# Patient Record
Sex: FEMALE | Race: WHITE | NOT HISPANIC OR LATINO | Employment: OTHER | URBAN - METROPOLITAN AREA
[De-identification: names, ages, dates, MRNs, and addresses within clinical notes are randomized per-mention and may not be internally consistent; named-entity substitution may affect disease eponyms.]

---

## 2018-02-07 ENCOUNTER — PES CALL (OUTPATIENT)
Dept: ADMINISTRATIVE | Facility: CLINIC | Age: 73
End: 2018-02-07

## 2018-02-22 ENCOUNTER — DOCUMENTATION ONLY (OUTPATIENT)
Dept: FAMILY MEDICINE | Facility: CLINIC | Age: 73
End: 2018-02-22

## 2018-02-22 NOTE — PROGRESS NOTES
Pre-Visit Chart Review  For Appointment Scheduled on 02/23/2018      Health Maintenance Due   Topic Date Due    TETANUS VACCINE  11/25/1961    DEXA SCAN  11/25/1981    Zoster Vaccine  11/25/2003    Pneumococcal (65+) (1 of 2 - PCV13) 11/25/2008    Influenza Vaccine  08/01/2017    Mammogram  12/16/2017

## 2018-04-16 LAB
CHOLESTEROL, TOTAL: 193
HDLC SERPL-MCNC: 70 MG/DL
LDLC SERPL CALC-MCNC: 94 MG/DL
TRIGLYCERIDE (LIPID PAN): 144

## 2018-10-01 ENCOUNTER — LAB VISIT (OUTPATIENT)
Dept: LAB | Facility: HOSPITAL | Age: 73
End: 2018-10-01
Attending: FAMILY MEDICINE
Payer: MEDICARE

## 2018-10-01 ENCOUNTER — OFFICE VISIT (OUTPATIENT)
Dept: FAMILY MEDICINE | Facility: CLINIC | Age: 73
End: 2018-10-01
Payer: MEDICARE

## 2018-10-01 VITALS
BODY MASS INDEX: 23.2 KG/M2 | HEIGHT: 66 IN | DIASTOLIC BLOOD PRESSURE: 71 MMHG | WEIGHT: 144.38 LBS | TEMPERATURE: 98 F | HEART RATE: 71 BPM | SYSTOLIC BLOOD PRESSURE: 131 MMHG

## 2018-10-01 DIAGNOSIS — M80.00XD AGE-RELATED OSTEOPOROSIS WITH CURRENT PATHOLOGICAL FRACTURE WITH ROUTINE HEALING, SUBSEQUENT ENCOUNTER: ICD-10-CM

## 2018-10-01 DIAGNOSIS — R53.83 FATIGUE, UNSPECIFIED TYPE: ICD-10-CM

## 2018-10-01 DIAGNOSIS — N39.498 OTHER URINARY INCONTINENCE: ICD-10-CM

## 2018-10-01 DIAGNOSIS — E72.10: ICD-10-CM

## 2018-10-01 DIAGNOSIS — R53.83 FATIGUE, UNSPECIFIED TYPE: Primary | ICD-10-CM

## 2018-10-01 LAB
25(OH)D3+25(OH)D2 SERPL-MCNC: 50 NG/ML
ALBUMIN SERPL BCP-MCNC: 4.2 G/DL
ALP SERPL-CCNC: 72 U/L
ALT SERPL W/O P-5'-P-CCNC: 24 U/L
ANION GAP SERPL CALC-SCNC: 10 MMOL/L
AST SERPL-CCNC: 24 U/L
BASOPHILS # BLD AUTO: 0.04 K/UL
BASOPHILS NFR BLD: 0.6 %
BILIRUB SERPL-MCNC: 0.2 MG/DL
BUN SERPL-MCNC: 20 MG/DL
CALCIUM SERPL-MCNC: 9.6 MG/DL
CHLORIDE SERPL-SCNC: 108 MMOL/L
CO2 SERPL-SCNC: 24 MMOL/L
CREAT SERPL-MCNC: 0.8 MG/DL
DIFFERENTIAL METHOD: ABNORMAL
EOSINOPHIL # BLD AUTO: 0.2 K/UL
EOSINOPHIL NFR BLD: 2.4 %
ERYTHROCYTE [DISTWIDTH] IN BLOOD BY AUTOMATED COUNT: 14.2 %
EST. GFR  (AFRICAN AMERICAN): >60 ML/MIN/1.73 M^2
EST. GFR  (NON AFRICAN AMERICAN): >60 ML/MIN/1.73 M^2
GLUCOSE SERPL-MCNC: 88 MG/DL
HCT VFR BLD AUTO: 39.5 %
HGB BLD-MCNC: 12.6 G/DL
IMM GRANULOCYTES # BLD AUTO: 0.01 K/UL
IMM GRANULOCYTES NFR BLD AUTO: 0.1 %
LYMPHOCYTES # BLD AUTO: 3.1 K/UL
LYMPHOCYTES NFR BLD: 46.1 %
MCH RBC QN AUTO: 31.7 PG
MCHC RBC AUTO-ENTMCNC: 31.9 G/DL
MCV RBC AUTO: 100 FL
MONOCYTES # BLD AUTO: 0.5 K/UL
MONOCYTES NFR BLD: 7.2 %
NEUTROPHILS # BLD AUTO: 3 K/UL
NEUTROPHILS NFR BLD: 43.6 %
NRBC BLD-RTO: 0 /100 WBC
PLATELET # BLD AUTO: 275 K/UL
PMV BLD AUTO: 10.7 FL
POTASSIUM SERPL-SCNC: 4.2 MMOL/L
PROT SERPL-MCNC: 7.3 G/DL
RBC # BLD AUTO: 3.97 M/UL
SODIUM SERPL-SCNC: 142 MMOL/L
TSH SERPL DL<=0.005 MIU/L-ACNC: 1.48 UIU/ML
VIT B12 SERPL-MCNC: >2000 PG/ML
WBC # BLD AUTO: 6.77 K/UL

## 2018-10-01 PROCEDURE — 36415 COLL VENOUS BLD VENIPUNCTURE: CPT | Mod: PO

## 2018-10-01 PROCEDURE — 99999 PR PBB SHADOW E&M-EST. PATIENT-LVL IV: CPT | Mod: PBBFAC,,, | Performed by: FAMILY MEDICINE

## 2018-10-01 PROCEDURE — 99203 OFFICE O/P NEW LOW 30 MIN: CPT | Mod: 25,S$PBB,, | Performed by: FAMILY MEDICINE

## 2018-10-01 PROCEDURE — 82607 VITAMIN B-12: CPT

## 2018-10-01 PROCEDURE — 82306 VITAMIN D 25 HYDROXY: CPT

## 2018-10-01 PROCEDURE — 96372 THER/PROPH/DIAG INJ SC/IM: CPT | Mod: PBBFAC,PO

## 2018-10-01 PROCEDURE — 1101F PT FALLS ASSESS-DOCD LE1/YR: CPT | Mod: CPTII,,, | Performed by: FAMILY MEDICINE

## 2018-10-01 PROCEDURE — 99499 UNLISTED E&M SERVICE: CPT | Mod: S$GLB,,, | Performed by: FAMILY MEDICINE

## 2018-10-01 PROCEDURE — 84443 ASSAY THYROID STIM HORMONE: CPT

## 2018-10-01 PROCEDURE — 85025 COMPLETE CBC W/AUTO DIFF WBC: CPT

## 2018-10-01 PROCEDURE — 80053 COMPREHEN METABOLIC PANEL: CPT

## 2018-10-01 PROCEDURE — 99214 OFFICE O/P EST MOD 30 MIN: CPT | Mod: PBBFAC,PO,25 | Performed by: FAMILY MEDICINE

## 2018-10-01 RX ORDER — ERYTHROMYCIN 5 MG/G
OINTMENT OPHTHALMIC
COMMUNITY
Start: 2018-06-25

## 2018-10-01 RX ORDER — CYANOCOBALAMIN 1000 UG/ML
1000 INJECTION, SOLUTION INTRAMUSCULAR; SUBCUTANEOUS
Status: COMPLETED | OUTPATIENT
Start: 2018-10-01 | End: 2018-10-01

## 2018-10-01 RX ORDER — OXYBUTYNIN CHLORIDE 5 MG/1
5 TABLET, EXTENDED RELEASE ORAL DAILY
Qty: 90 TABLET | Refills: 0 | Status: SHIPPED | OUTPATIENT
Start: 2018-10-01 | End: 2023-04-10

## 2018-10-01 RX ORDER — VALACYCLOVIR HYDROCHLORIDE 1 G/1
TABLET, FILM COATED ORAL
Refills: 5 | COMMUNITY
Start: 2018-07-26 | End: 2019-08-23

## 2018-10-01 RX ORDER — ATORVASTATIN CALCIUM 10 MG/1
TABLET, FILM COATED ORAL
Refills: 2 | COMMUNITY
Start: 2018-07-26 | End: 2020-09-24

## 2018-10-01 RX ORDER — LINACLOTIDE 72 UG/1
CAPSULE, GELATIN COATED ORAL
Refills: 3 | COMMUNITY
Start: 2018-08-25 | End: 2022-11-08

## 2018-10-01 RX ORDER — TOBRAMYCIN 3 MG/ML
SOLUTION/ DROPS OPHTHALMIC
COMMUNITY
Start: 2018-06-25

## 2018-10-01 RX ADMIN — CYANOCOBALAMIN 1000 MCG: 1000 INJECTION INTRAMUSCULAR; SUBCUTANEOUS at 04:10

## 2018-10-01 NOTE — PROGRESS NOTES
Administered 2,000 mcg B-12 IM. Patient tolerated well. No bleeding at insertion site noted. Pain scale 0/10 with injection. 2 patient identifiers used (name, ), aseptic technique maintained.

## 2018-10-01 NOTE — PROGRESS NOTES
Subjective:   Patient ID: Gala Gaines is a 72 y.o. female     Chief Complaint:Fatigue      Pt with fatigue that is intermittent and has been going on for years. When it occurs the patient feels very fatigeud and unable to get out of bed. Sometimes occurs in early afternoon. Patient states in the past she has had b12 injections which have helped. Pt does endorse the loss of her son jolie 3 years ago. She nicolle with this through going to [facility]. She lives with her son and daughter-in-law.       Review of Systems   Constitutional: Positive for fatigue. Negative for chills and fever.   HENT: Negative for sore throat.    Eyes: Negative for visual disturbance.   Respiratory: Negative for shortness of breath.    Cardiovascular: Negative for chest pain.   Gastrointestinal: Negative for abdominal pain.   Endocrine: Negative for polyuria.   Genitourinary: Negative for dysuria.   Musculoskeletal: Negative for back pain.   Skin: Negative for color change.   Neurological: Negative for headaches.   Psychiatric/Behavioral: Negative for agitation and confusion.     History reviewed. No pertinent past medical history.  Objective:     Vitals:    10/01/18 1523   BP: 131/71   Pulse: 71   Temp: 98.1 °F (36.7 °C)     Body mass index is 23.31 kg/m².  Physical Exam   Constitutional: She is oriented to person, place, and time. She appears well-developed and well-nourished.   HENT:   Head: Normocephalic and atraumatic.   Eyes: EOM are normal. Pupils are equal, round, and reactive to light.   Neck: Normal range of motion. Neck supple.   Cardiovascular: Normal rate, regular rhythm, normal heart sounds and intact distal pulses.   Pulmonary/Chest: Effort normal and breath sounds normal.   Abdominal: Soft. She exhibits no distension.   Musculoskeletal: Normal range of motion.   Neurological: She is alert and oriented to person, place, and time.   Skin: Skin is warm and dry.   Psychiatric: She has a normal mood and affect. Her behavior is  normal. Judgment and thought content normal.   Nursing note and vitals reviewed.    Assessment:     1. Fatigue, unspecified type    2. Age-related osteoporosis with current pathological fracture with routine healing, subsequent encounter     3. Disorder of sulfur-bearing amino-acid metabolism     4. Other urinary incontinence      Plan:   Fatigue, unspecified type  -     Vitamin D; Future; Expected date: 10/01/2018  -     Vitamin B12; Future; Expected date: 10/01/2018  -     Comprehensive metabolic panel; Future; Expected date: 10/01/2018  -     CBC auto differential; Future; Expected date: 10/01/2018  -     TSH; Future; Expected date: 10/01/2018  -     URINALYSIS; Future; Expected date: 10/01/2018  -     cyanocobalamin injection 1,000 mcg; Inject 1 mL (1,000 mcg total) into the muscle one time.  - reviewed labwork form [facility] on 4/2018 and no major abnormalities but TSH was not ordered  - pt may need scheduled for sleep study if labwork does not show abnormality  - will give vitamin B12 injection, pt aware may not be covered by injection and is ok with cost    Age-related osteoporosis with current pathological fracture with routine healing, subsequent encounter   -     Vitamin D; Future; Expected date: 10/01/2018  - pt with history of osteoporosis  Disorder of sulfur-bearing amino-acid metabolism   -     Vitamin B12; Future; Expected date: 10/01/2018    Other urinary incontinence  -     oxybutynin (DITROPAN-XL) 5 MG TR24; Take 1 tablet (5 mg total) by mouth once daily.  Dispense: 90 tablet; Refill: 0        Discharge:   Time spent with patient: 45 minutes and over half of that time was spent on counseling an coordination of care.    Barriers to medications present (no )    Adverse reactions to current medications (no)    Over the counter medications reviewed (Yes)      Abdirahman Calle MD  10/01/2018

## 2018-10-05 ENCOUNTER — TELEPHONE (OUTPATIENT)
Dept: FAMILY MEDICINE | Facility: CLINIC | Age: 73
End: 2018-10-05

## 2018-10-05 NOTE — TELEPHONE ENCOUNTER
Called pt regarding below message. Pt is requesting to reschedule her flu shot appt. Appt date, time, and location given. Pt verbalized understanding with no further questions.     ----- Message from Rakesh Gillis sent at 10/5/2018 12:12 PM CDT -----  Contact: Pt  The Pt is requesting a call back regarding rescheduling the flu shot appointment. Please call Pt to advise.     Call Back#: [phone]   Thanks

## 2018-10-10 ENCOUNTER — CLINICAL SUPPORT (OUTPATIENT)
Dept: FAMILY MEDICINE | Facility: CLINIC | Age: 73
End: 2018-10-10
Payer: MEDICARE

## 2018-10-10 DIAGNOSIS — Z23 IMMUNIZATION DUE: Primary | ICD-10-CM

## 2018-10-10 PROCEDURE — 90670 PCV13 VACCINE IM: CPT | Mod: PBBFAC,PO

## 2018-10-10 PROCEDURE — 90662 IIV NO PRSV INCREASED AG IM: CPT | Mod: PBBFAC,PO

## 2018-10-10 NOTE — PROGRESS NOTES
Administered flu and prevnar vaccine IM. Patient tolerated well. No bleeding at insertion site noted. Pain scale 0/10 with injection. 2 patient identifiers used (name, ), aseptic technique maintained.

## 2018-12-18 ENCOUNTER — PATIENT OUTREACH (OUTPATIENT)
Dept: ADMINISTRATIVE | Facility: HOSPITAL | Age: 73
End: 2018-12-18

## 2018-12-18 ENCOUNTER — TELEPHONE (OUTPATIENT)
Dept: ADMINISTRATIVE | Facility: HOSPITAL | Age: 73
End: 2018-12-18

## 2018-12-18 DIAGNOSIS — Z78.0 ASYMPTOMATIC MENOPAUSAL STATE: Primary | ICD-10-CM

## 2018-12-18 NOTE — LETTER
December 18, 2018    Gala Gaines  [address]  [place] [address]             [facility]  [address]  [facility] [address]  Phone: [phone] [facility] is committed to your overall health and would like to ensure that you are up to date on your recommended test and/or procedures.   Abdirahman Calle MD  has found that your chart shows you may be due for the following:    BONE MINERAL DENSITY SCAN      If you have had any of the above done at another facility, please let us know so that we may obtain copies from that facility.  If you have a copy of these records, please provide a copy for us to scan into your chart.  You are welcome to request that the report be faxed to us at  ([phone]).     Otherwise, please schedule these appointments at your earliest convenience by calling [phone] or going to [email or web address].        Sincerely,  [facility]  MD Savita Amaya LPN Clinical Care Coordinator  [facility]  [address] [facility] [place] [address]  Phone [phone]  Fax [phone]

## 2018-12-19 ENCOUNTER — TELEPHONE (OUTPATIENT)
Dept: ADMINISTRATIVE | Facility: HOSPITAL | Age: 73
End: 2018-12-19

## 2018-12-24 ENCOUNTER — DOCUMENTATION ONLY (OUTPATIENT)
Dept: FAMILY MEDICINE | Facility: CLINIC | Age: 73
End: 2018-12-24

## 2019-08-05 ENCOUNTER — PATIENT OUTREACH (OUTPATIENT)
Dept: ADMINISTRATIVE | Facility: HOSPITAL | Age: 74
End: 2019-08-05

## 2019-08-05 NOTE — PROGRESS NOTES
Health Maintenance Due   Topic Date Due    TETANUS VACCINE  11/25/1961    DEXA SCAN  11/25/1983

## 2019-08-05 NOTE — LETTER
August 5, 2019    Gala Gaines  [address]  [place] [address]             [facility]  [address]  [facility] [address]  Phone: [phone] [facility] is committed to your overall health.  To help you get the most out of each of your visits, we will review your information to make sure you are up to date on all of your recommended tests and/or procedures.      Dr. Abdirahman Calle MD has found that your chart shows you may be due for a:    BONE MINERAL DENSITY SCAN (DEXA)       If you have had any of the above done at another facility, please bring the records or information with you so that your record at [facility] will be complete.  If you would like to schedule any of these, please contact the clinic at [phone].    If you are currently taking medication, please bring it with you to your appointment for review.    Also, if you have any type of Advanced Directives, please bring them with you to your office visit so we may scan them into your chart.    Thank You,    [facility],  MD Halina Thakur LPN Clinical Care Coordinator  [place] [facility]  [address] [place] [address]  Phone [phone]  Fax [phone]

## 2019-08-15 ENCOUNTER — DOCUMENTATION ONLY (OUTPATIENT)
Dept: FAMILY MEDICINE | Facility: CLINIC | Age: 74
End: 2019-08-15

## 2019-08-15 NOTE — PROGRESS NOTES
Pre-Visit Chart Review  For Appointment Scheduled on 8/16/19    Health Maintenance Due   Topic Date Due    TETANUS VACCINE  11/25/1961    DEXA SCAN  11/25/1983

## 2019-08-22 ENCOUNTER — DOCUMENTATION ONLY (OUTPATIENT)
Dept: FAMILY MEDICINE | Facility: CLINIC | Age: 74
End: 2019-08-22

## 2019-08-22 NOTE — PROGRESS NOTES
Pre-Visit Chart Review  For Appointment Scheduled on 8/23/19    Health Maintenance Due   Topic Date Due    TETANUS VACCINE  11/25/1961    DEXA SCAN  11/25/1983

## 2019-08-23 ENCOUNTER — OFFICE VISIT (OUTPATIENT)
Dept: FAMILY MEDICINE | Facility: CLINIC | Age: 74
End: 2019-08-23
Payer: MEDICARE

## 2019-08-23 VITALS
HEIGHT: 66 IN | WEIGHT: 140 LBS | DIASTOLIC BLOOD PRESSURE: 60 MMHG | HEART RATE: 64 BPM | TEMPERATURE: 98 F | SYSTOLIC BLOOD PRESSURE: 100 MMHG | OXYGEN SATURATION: 98 % | BODY MASS INDEX: 22.5 KG/M2

## 2019-08-23 DIAGNOSIS — E53.8 B12 DEFICIENCY: ICD-10-CM

## 2019-08-23 DIAGNOSIS — R53.83 FATIGUE, UNSPECIFIED TYPE: Primary | ICD-10-CM

## 2019-08-23 DIAGNOSIS — R79.9 ABNORMAL FINDING OF BLOOD CHEMISTRY: ICD-10-CM

## 2019-08-23 DIAGNOSIS — E04.1 THYROID NODULE: ICD-10-CM

## 2019-08-23 PROCEDURE — 3288F FALL RISK ASSESSMENT DOCD: CPT | Mod: HCNC,CPTII,S$GLB, | Performed by: FAMILY MEDICINE

## 2019-08-23 PROCEDURE — 99999 PR PBB SHADOW E&M-EST. PATIENT-LVL IV: ICD-10-PCS | Mod: PBBFAC,HCNC,, | Performed by: FAMILY MEDICINE

## 2019-08-23 PROCEDURE — 99214 OFFICE O/P EST MOD 30 MIN: CPT | Mod: HCNC,S$GLB,, | Performed by: FAMILY MEDICINE

## 2019-08-23 PROCEDURE — 1100F PTFALLS ASSESS-DOCD GE2>/YR: CPT | Mod: HCNC,CPTII,S$GLB, | Performed by: FAMILY MEDICINE

## 2019-08-23 PROCEDURE — 99214 PR OFFICE/OUTPT VISIT, EST, LEVL IV, 30-39 MIN: ICD-10-PCS | Mod: HCNC,S$GLB,, | Performed by: FAMILY MEDICINE

## 2019-08-23 PROCEDURE — 3288F PR FALLS RISK ASSESSMENT DOCUMENTED: ICD-10-PCS | Mod: HCNC,CPTII,S$GLB, | Performed by: FAMILY MEDICINE

## 2019-08-23 PROCEDURE — 1100F PR PT FALLS ASSESS DOC 2+ FALLS/FALL W/INJURY/YR: ICD-10-PCS | Mod: HCNC,CPTII,S$GLB, | Performed by: FAMILY MEDICINE

## 2019-08-23 PROCEDURE — 99999 PR PBB SHADOW E&M-EST. PATIENT-LVL IV: CPT | Mod: PBBFAC,HCNC,, | Performed by: FAMILY MEDICINE

## 2019-08-23 RX ORDER — HYDROCODONE BITARTRATE AND ACETAMINOPHEN 10; 325 MG/1; MG/1
TABLET ORAL
COMMUNITY

## 2019-08-28 NOTE — PROGRESS NOTES
Subjective:   Patient ID: Gala Gaines is a 73 y.o. female     Chief Complaint:Fatigue      Patient with persistent symptoms of fatigue.  Patient is not obstructed sleep study at this time.  Patient feels this could be due to stress from the loss of her son past few years.  Otherwise patient overall is doing well.    Review of Systems   Constitutional: Positive for fatigue.   Respiratory: Negative for shortness of breath.    Cardiovascular: Negative for chest pain.   Gastrointestinal: Negative for abdominal pain.   Genitourinary: Negative for dysuria.     No past medical history on file.  Objective:     Vitals:    08/23/19 0906   BP: 100/60   Pulse: 64   Temp: 98.1 °F (36.7 °C)     Body mass index is 22.6 kg/m².  Physical Exam   Neck: Neck supple. No tracheal deviation present.   Cardiovascular: Normal rate, regular rhythm and normal heart sounds.   Pulmonary/Chest: Effort normal. No respiratory distress.   Musculoskeletal: Normal range of motion. She exhibits no edema.     Assessment:     1. Fatigue, unspecified type    2. Abnormal finding of blood chemistry     3. Thyroid nodule    4. B12 deficiency      Plan:   Fatigue, unspecified type  -     Hemoglobin A1c; Future; Expected date: 08/23/2019  -     TSH; Future; Expected date: 08/23/2019  -     T4, free; Future; Expected date: 08/23/2019  -     Ferritin; Future; Expected date: 08/23/2019  -     Iron and TIBC; Future; Expected date: 08/23/2019  -     CBC auto differential; Future; Expected date: 08/23/2019  -     Lipid panel; Future; Expected date: 08/23/2019  -     Comprehensive metabolic panel; Future; Expected date: 11/23/2019  -     Urinalysis; Future; Expected date: 08/23/2019  -     Vitamin B12; Future; Expected date: 08/23/2019  -     Folate; Future; Expected date: 08/23/2019    Abnormal finding of blood chemistry   -     Hemoglobin A1c; Future; Expected date: 08/23/2019  -     Ferritin; Future; Expected date: 08/23/2019  -     Iron and TIBC; Future;  Expected date: 08/23/2019  -     Lipid panel; Future; Expected date: 08/23/2019    Thyroid nodule  -     US Soft Tissue Head Neck Thyroid; Future; Expected date: 08/23/2019    B12 deficiency  -     Vitamin B12; Future; Expected date: 08/23/2019  -     Folate; Future; Expected date: 08/23/2019            Time spent with patient: 30 minutes and over half of that time was spent on counseling an coordination of care.    Abdirahman Calle MD  08/28/2019    Portions of this note have been dictated with NOELLE Rodriguez

## 2019-08-29 ENCOUNTER — TELEPHONE (OUTPATIENT)
Dept: FAMILY MEDICINE | Facility: CLINIC | Age: 74
End: 2019-08-29

## 2019-09-05 ENCOUNTER — TELEPHONE (OUTPATIENT)
Dept: FAMILY MEDICINE | Facility: CLINIC | Age: 74
End: 2019-09-05

## 2019-09-05 RX ORDER — ATORVASTATIN CALCIUM 10 MG/1
TABLET, FILM COATED ORAL
Refills: 2 | Status: CANCELLED | OUTPATIENT
Start: 2019-09-05

## 2019-09-05 NOTE — TELEPHONE ENCOUNTER
----- Message from Shirley Hernandez sent at 9/5/2019 11:26 AM CDT -----  Contact: [facility]    Type:  Diabetic/Medical Supplies Request    Name of Caller:  Dot  What supplies are needed: Accu check Stella Plus meter and test strips,lancets  What is the brand of the supplies:  Accu Check Stella  Refill or New Rx:  Refill  If checking glucose, how many times do they check it?:    Who prescribed the original supplies: Luc  Pharmacy/Company Name, Phone #, Location:  [facility]  Requesting a Call Back:  [phone]  Best Call Back Number: [phone]  Additional Information:

## 2019-09-05 NOTE — TELEPHONE ENCOUNTER
Spoke to Susy Merino, confirmed if diabetic supplies is being requested, she stated that this is an error. Disregard this message.

## 2019-09-11 ENCOUNTER — HOSPITAL ENCOUNTER (OUTPATIENT)
Dept: RADIOLOGY | Facility: CLINIC | Age: 74
Discharge: HOME | End: 2019-09-11
Attending: FAMILY MEDICINE
Payer: MEDICARE

## 2019-09-11 DIAGNOSIS — E04.1 THYROID NODULE: ICD-10-CM

## 2019-09-11 PROCEDURE — 76536 US EXAM OF HEAD AND NECK: CPT | Mod: TC,HCNC,PO

## 2019-09-11 PROCEDURE — 76536 US SOFT TISSUE HEAD NECK THYROID: ICD-10-PCS | Mod: 26,HCNC,, | Performed by: RADIOLOGY

## 2019-09-11 PROCEDURE — 76536 US EXAM OF HEAD AND NECK: CPT | Mod: 26,HCNC,, | Performed by: RADIOLOGY

## 2019-09-29 ENCOUNTER — HOSPITAL ENCOUNTER (OUTPATIENT)
Facility: HOSPITAL | Age: 74
Discharge: HOME | End: 2019-09-30
Attending: EMERGENCY MEDICINE | Admitting: INTERNAL MEDICINE
Payer: MEDICARE

## 2019-09-29 DIAGNOSIS — R07.9 CHEST PAIN: ICD-10-CM

## 2019-09-29 PROCEDURE — 85610 PROTHROMBIN TIME: CPT

## 2019-09-29 PROCEDURE — 93005 ELECTROCARDIOGRAM TRACING: CPT

## 2019-09-29 PROCEDURE — 80053 COMPREHEN METABOLIC PANEL: CPT

## 2019-09-29 PROCEDURE — 83735 ASSAY OF MAGNESIUM: CPT

## 2019-09-29 PROCEDURE — 99285 EMERGENCY DEPT VISIT HI MDM: CPT | Mod: 25

## 2019-09-29 PROCEDURE — 85025 COMPLETE CBC W/AUTO DIFF WBC: CPT

## 2019-09-29 PROCEDURE — 84484 ASSAY OF TROPONIN QUANT: CPT

## 2019-09-29 PROCEDURE — 25000003 PHARM REV CODE 250: Performed by: EMERGENCY MEDICINE

## 2019-09-29 RX ORDER — NAPROXEN SODIUM 220 MG/1
243 TABLET, FILM COATED ORAL
Status: COMPLETED | OUTPATIENT
Start: 2019-09-29 | End: 2019-09-29

## 2019-09-29 RX ADMIN — NITROGLYCERIN 1 INCH: 20 OINTMENT TOPICAL at 11:09

## 2019-09-29 RX ADMIN — ASPIRIN 81 MG 243 MG: 81 TABLET ORAL at 11:09

## 2019-09-30 ENCOUNTER — CLINICAL SUPPORT (OUTPATIENT)
Dept: CARDIOLOGY | Facility: HOSPITAL | Age: 74
End: 2019-09-30
Attending: EMERGENCY MEDICINE
Payer: MEDICARE

## 2019-09-30 ENCOUNTER — HOSPITAL ENCOUNTER (OUTPATIENT)
Dept: RADIOLOGY | Facility: HOSPITAL | Age: 74
Discharge: HOME | End: 2019-09-30
Attending: EMERGENCY MEDICINE
Payer: MEDICARE

## 2019-09-30 VITALS
RESPIRATION RATE: 16 BRPM | HEIGHT: 66 IN | HEART RATE: 81 BPM | OXYGEN SATURATION: 98 % | WEIGHT: 142.94 LBS | BODY MASS INDEX: 22.97 KG/M2 | DIASTOLIC BLOOD PRESSURE: 73 MMHG | TEMPERATURE: 98 F | SYSTOLIC BLOOD PRESSURE: 129 MMHG

## 2019-09-30 PROBLEM — I10 HYPERTENSION: Status: ACTIVE | Noted: 2019-09-30

## 2019-09-30 PROBLEM — R07.9 CHEST PAIN: Status: RESOLVED | Noted: 2019-09-30 | Resolved: 2019-09-30

## 2019-09-30 PROBLEM — R07.9 CHEST PAIN: Status: ACTIVE | Noted: 2019-09-30

## 2019-09-30 LAB
ALBUMIN SERPL BCP-MCNC: 4 G/DL (ref 3.5–5.2)
ALP SERPL-CCNC: 56 U/L (ref 55–135)
ALT SERPL W/O P-5'-P-CCNC: 17 U/L (ref 10–44)
ANION GAP SERPL CALC-SCNC: 9 MMOL/L (ref 8–16)
AST SERPL-CCNC: 17 U/L (ref 10–40)
BASOPHILS # BLD AUTO: 0.03 K/UL (ref 0–0.2)
BASOPHILS NFR BLD: 0.4 % (ref 0–1.9)
BILIRUB SERPL-MCNC: 0.5 MG/DL (ref 0.1–1)
BUN SERPL-MCNC: 21 MG/DL (ref 8–23)
CALCIUM SERPL-MCNC: 9.1 MG/DL (ref 8.7–10.5)
CHLORIDE SERPL-SCNC: 105 MMOL/L (ref 95–110)
CO2 SERPL-SCNC: 25 MMOL/L (ref 23–29)
CREAT SERPL-MCNC: 0.6 MG/DL (ref 0.5–1.4)
CV STRESS BASE HR: 64 BPM
DIASTOLIC BLOOD PRESSURE: 70 MMHG
DIFFERENTIAL METHOD: ABNORMAL
EOSINOPHIL # BLD AUTO: 0.2 K/UL (ref 0–0.5)
EOSINOPHIL NFR BLD: 2.1 % (ref 0–8)
ERYTHROCYTE [DISTWIDTH] IN BLOOD BY AUTOMATED COUNT: 13.8 % (ref 11.5–14.5)
EST. GFR  (AFRICAN AMERICAN): >60 ML/MIN/1.73 M^2
EST. GFR  (NON AFRICAN AMERICAN): >60 ML/MIN/1.73 M^2
GLUCOSE SERPL-MCNC: 94 MG/DL (ref 70–110)
HCT VFR BLD AUTO: 34.7 % (ref 37–48.5)
HGB BLD-MCNC: 11.1 G/DL (ref 12–16)
IMM GRANULOCYTES # BLD AUTO: 0.03 K/UL (ref 0–0.04)
IMM GRANULOCYTES NFR BLD AUTO: 0.4 % (ref 0–0.5)
INR PPP: 1
LYMPHOCYTES # BLD AUTO: 2.7 K/UL (ref 1–4.8)
LYMPHOCYTES NFR BLD: 32.2 % (ref 18–48)
MAGNESIUM SERPL-MCNC: 1.9 MG/DL (ref 1.6–2.6)
MCH RBC QN AUTO: 31.3 PG (ref 27–31)
MCHC RBC AUTO-ENTMCNC: 32 G/DL (ref 32–36)
MCV RBC AUTO: 98 FL (ref 82–98)
MONOCYTES # BLD AUTO: 0.8 K/UL (ref 0.3–1)
MONOCYTES NFR BLD: 10.1 % (ref 4–15)
NEUTROPHILS # BLD AUTO: 4.5 K/UL (ref 1.8–7.7)
NEUTROPHILS NFR BLD: 54.8 % (ref 38–73)
NRBC BLD-RTO: 0 /100 WBC
OHS CV CPX 85 PERCENT MAX PREDICTED HEART RATE MALE: 119
OHS CV CPX MAX PREDICTED HEART RATE: 140
OHS CV CPX PATIENT IS FEMALE: 1
OHS CV CPX PATIENT IS MALE: 0
OHS CV CPX PEAK DIASTOLIC BLOOD PRESSURE: 60 MMHG
OHS CV CPX PEAK HEAR RATE: 105 BPM
OHS CV CPX PEAK RATE PRESSURE PRODUCT: NORMAL
OHS CV CPX PEAK SYSTOLIC BLOOD PRESSURE: 161 MMHG
OHS CV CPX PERCENT MAX PREDICTED HEART RATE ACHIEVED: 75
OHS CV CPX RATE PRESSURE PRODUCT PRESENTING: 7936
PLATELET # BLD AUTO: 215 K/UL (ref 150–350)
PMV BLD AUTO: 10.9 FL (ref 9.2–12.9)
POTASSIUM SERPL-SCNC: 4 MMOL/L (ref 3.5–5.1)
PROT SERPL-MCNC: 6.6 G/DL (ref 6–8.4)
PROTHROMBIN TIME: 13.1 SEC (ref 11.7–14)
RBC # BLD AUTO: 3.55 M/UL (ref 4–5.4)
SODIUM SERPL-SCNC: 139 MMOL/L (ref 136–145)
STRESS ECHO TARGET HR: 123.25 BPM
SYSTOLIC BLOOD PRESSURE: 124 MMHG
TROPONIN I SERPL DL<=0.01 NG/ML-MCNC: <0.03 NG/ML (ref 0.02–0.04)
WBC # BLD AUTO: 8.22 K/UL (ref 3.9–12.7)

## 2019-09-30 PROCEDURE — 94760 N-INVAS EAR/PLS OXIMETRY 1: CPT

## 2019-09-30 PROCEDURE — A9502 TC99M TETROFOSMIN: HCPCS

## 2019-09-30 PROCEDURE — G0378 HOSPITAL OBSERVATION PER HR: HCPCS

## 2019-09-30 PROCEDURE — 93017 CV STRESS TEST TRACING ONLY: CPT

## 2019-09-30 PROCEDURE — 63600175 PHARM REV CODE 636 W HCPCS: Performed by: EMERGENCY MEDICINE

## 2019-09-30 PROCEDURE — 36415 COLL VENOUS BLD VENIPUNCTURE: CPT

## 2019-09-30 PROCEDURE — 25000003 PHARM REV CODE 250: Performed by: NURSE PRACTITIONER

## 2019-09-30 PROCEDURE — 84484 ASSAY OF TROPONIN QUANT: CPT | Mod: 91

## 2019-09-30 RX ORDER — ACETAMINOPHEN 325 MG/1
650 TABLET ORAL EVERY 4 HOURS PRN
Status: DISCONTINUED | OUTPATIENT
Start: 2019-09-30 | End: 2019-09-30 | Stop reason: HOSPADM

## 2019-09-30 RX ORDER — ONDANSETRON 2 MG/ML
4 INJECTION INTRAMUSCULAR; INTRAVENOUS EVERY 8 HOURS PRN
Status: DISCONTINUED | OUTPATIENT
Start: 2019-09-30 | End: 2019-09-30 | Stop reason: HOSPADM

## 2019-09-30 RX ORDER — HYDROCODONE BITARTRATE AND ACETAMINOPHEN 10; 325 MG/1; MG/1
1 TABLET ORAL EVERY 6 HOURS PRN
Status: DISCONTINUED | OUTPATIENT
Start: 2019-09-30 | End: 2019-09-30 | Stop reason: HOSPADM

## 2019-09-30 RX ORDER — ATORVASTATIN CALCIUM 10 MG/1
10 TABLET, FILM COATED ORAL DAILY
Status: DISCONTINUED | OUTPATIENT
Start: 2019-09-30 | End: 2019-09-30 | Stop reason: HOSPADM

## 2019-09-30 RX ORDER — HYDROCODONE BITARTRATE AND ACETAMINOPHEN 10; 325 MG/1; MG/1
1 TABLET ORAL ONCE
Status: COMPLETED | OUTPATIENT
Start: 2019-09-30 | End: 2019-09-30

## 2019-09-30 RX ORDER — ASPIRIN 81 MG/1
81 TABLET ORAL DAILY
Status: DISCONTINUED | OUTPATIENT
Start: 2019-09-30 | End: 2019-09-30 | Stop reason: HOSPADM

## 2019-09-30 RX ORDER — BISACODYL 10 MG
10 SUPPOSITORY, RECTAL RECTAL DAILY PRN
Status: DISCONTINUED | OUTPATIENT
Start: 2019-09-30 | End: 2019-09-30 | Stop reason: HOSPADM

## 2019-09-30 RX ORDER — NITROGLYCERIN 0.4 MG/1
0.4 TABLET SUBLINGUAL EVERY 5 MIN PRN
Status: DISCONTINUED | OUTPATIENT
Start: 2019-09-30 | End: 2019-09-30 | Stop reason: HOSPADM

## 2019-09-30 RX ORDER — REGADENOSON 0.08 MG/ML
0.4 INJECTION, SOLUTION INTRAVENOUS
Status: COMPLETED | OUTPATIENT
Start: 2019-09-30 | End: 2019-09-30

## 2019-09-30 RX ORDER — OXYBUTYNIN CHLORIDE 5 MG/1
5 TABLET, EXTENDED RELEASE ORAL DAILY
Status: DISCONTINUED | OUTPATIENT
Start: 2019-09-30 | End: 2019-09-30 | Stop reason: HOSPADM

## 2019-09-30 RX ORDER — PANTOPRAZOLE SODIUM 40 MG/1
40 TABLET, DELAYED RELEASE ORAL DAILY
Status: DISCONTINUED | OUTPATIENT
Start: 2019-09-30 | End: 2019-09-30 | Stop reason: HOSPADM

## 2019-09-30 RX ORDER — TOPIRAMATE 100 MG/1
100 TABLET, FILM COATED ORAL 2 TIMES DAILY
Status: DISCONTINUED | OUTPATIENT
Start: 2019-09-30 | End: 2019-09-30 | Stop reason: HOSPADM

## 2019-09-30 RX ORDER — OMEPRAZOLE/SODIUM BICARBONATE 20MG-1.1G
60 CAPSULE ORAL DAILY
Status: DISCONTINUED | OUTPATIENT
Start: 2019-09-30 | End: 2019-09-30

## 2019-09-30 RX ORDER — DULOXETIN HYDROCHLORIDE 30 MG/1
60 CAPSULE, DELAYED RELEASE ORAL DAILY
Status: DISCONTINUED | OUTPATIENT
Start: 2019-09-30 | End: 2019-09-30 | Stop reason: HOSPADM

## 2019-09-30 RX ORDER — SODIUM CHLORIDE 0.9 % (FLUSH) 0.9 %
10 SYRINGE (ML) INJECTION
Status: DISCONTINUED | OUTPATIENT
Start: 2019-09-30 | End: 2019-09-30 | Stop reason: HOSPADM

## 2019-09-30 RX ORDER — LEVOTHYROXINE SODIUM 25 UG/1
75 TABLET ORAL
Status: DISCONTINUED | OUTPATIENT
Start: 2019-09-30 | End: 2019-09-30 | Stop reason: HOSPADM

## 2019-09-30 RX ADMIN — REGADENOSON 0.4 MG: 0.08 INJECTION, SOLUTION INTRAVENOUS at 09:09

## 2019-09-30 RX ADMIN — LEVOTHYROXINE SODIUM 75 MCG: 0.03 TABLET ORAL at 05:09

## 2019-09-30 RX ADMIN — PANTOPRAZOLE SODIUM 40 MG: 40 TABLET, DELAYED RELEASE ORAL at 05:09

## 2019-09-30 RX ADMIN — HYDROCODONE BITARTRATE AND ACETAMINOPHEN 1 TABLET: 10; 325 TABLET ORAL at 01:09

## 2019-09-30 NOTE — PROGRESS NOTES
@  07:35  PATIENT INJECTED WITH  10.2 mCi Tc99m MYOVIEW LT AC IV FOR RESTING IMAGES.    REMAIN NPO STATUS.    MARIAELENA VAUGHN

## 2019-09-30 NOTE — ED PROVIDER NOTES
Encounter Date: 2019       History     Chief Complaint   Patient presents with    Chest Pain     Patient presents complaining of chest discomfort that is been ongoing since Wednesday.  Patient states on Tuesday she had lifted quite a few bags of clothes and thought that maybe that is why she was having discomfort in her chest however today was different because it started to radiate into her neck.  Patient has a known history of coronary artery disease.  Patient states the pain is about 8/10.  Movement does make it worse however she cannot reproduce the neck pain.  She denies any nausea vomiting or diaphoresis.        Review of patient's allergies indicates:   Allergen Reactions    Levofloxacin      Other reaction(s): fainting spells     No past medical history on file.  Past Surgical History:   Procedure Laterality Date    APPENDECTOMY       SECTION      CHOLECYSTECTOMY      HYSTERECTOMY      SHOULDER ARTHROSCOPY      right    TONSILLECTOMY       Family History   Problem Relation Age of Onset    Cancer Mother 51        breast cancer    Stroke Father      Social History     Tobacco Use    Smoking status: Never Smoker    Smokeless tobacco: Never Used   Substance Use Topics    Alcohol use: Yes     Comment: occasional    Drug use: No     Review of Systems   Cardiovascular: Positive for chest pain.   All other systems reviewed and are negative.      Physical Exam     Initial Vitals [19 2314]   BP Pulse Resp Temp SpO2   (!) 162/71 69 16 97.9 °F (36.6 °C) 100 %      MAP       --         Physical Exam    Nursing note and vitals reviewed.  Constitutional: She appears well-developed and well-nourished.   HENT:   Head: Normocephalic and atraumatic.   Eyes: EOM are normal. Pupils are equal, round, and reactive to light.   Neck: Normal range of motion. Neck supple.   Cardiovascular: Normal rate, regular rhythm, normal heart sounds and intact distal pulses.   Pulmonary/Chest: Breath sounds  normal. No respiratory distress.   Abdominal: Soft. Bowel sounds are normal.   Musculoskeletal: Normal range of motion.   Neurological: She is alert and oriented to person, place, and time. She displays normal reflexes. No cranial nerve deficit or sensory deficit.   Skin: Skin is warm and dry. Capillary refill takes less than 2 seconds.   Psychiatric: She has a normal mood and affect. Her behavior is normal. Judgment and thought content normal.         ED Course   Procedures  Labs Reviewed   COMPREHENSIVE METABOLIC PANEL   MAGNESIUM   TROPONIN I   CBC W/ AUTO DIFFERENTIAL   PROTIME-INR     EKG Readings: (Independently Interpreted)   EKG is normal sinus rhythm without acute ST changes       Imaging Results    None          Medical Decision Making:   Differential Diagnosis:   Differential includes but not limited to acute coronary syndrome, musculoskeletal pain, unstable angina, pneumothorax, pneumonia.                   ED Course as of Sep 30 0051   Mon Sep 30, 2019   0037 Chest x-ray is hyperinflated with no acute cardiopulmonary process    [AP]      ED Course User Index  [AP] Kana Rodney MD     Clinical Impression:       ICD-10-CM ICD-9-CM   1. Chest pain R07.9 786.50                                Kana Rodney MD  09/30/19 0118

## 2019-09-30 NOTE — H&P
[facility] Medicine  History & Physical  Date of service: 2019 at 0130    Patient Name: Gala Gaines  MRN: [id number]  Admission Date: 2019  Attending Physician: Jeison Alaniz MD   Primary Care Provider: Abdirahman Calle MD         Patient information was obtained from patient and ER records.     Subjective:     Principal Problem:Chest pain    Chief Complaint:   Chief Complaint   Patient presents with    Chest Pain        HPI: The patient is a 73-year-old female with history of nonobstructive coronary artery disease, hypothyroidism, GERD, migraine.  She presented to the emergency department with chest pain. He states that she was lifting a few bag of clothes about 5 days ago. She started having severe constant left sided chest pain on the following day. She describes pain as as hard pain, radiated to back. She denies shortness of breath, nausea, vomiting, or diaphoresis. Pain is slightly improved with pain medication which she occasionally takes for migraine headache. She has been having pain in her neck since yesterday. She gets very concern and presented to the ED.     Initial work up in the ED is unremarkable.  Troponin is negative.  EKG showed no acute changes.  She is however noted to be slightly hypertensive with systolic blood pressures in the 150-160 range.    No past medical history on file.    Past Surgical History:   Procedure Laterality Date    APPENDECTOMY       SECTION      CHOLECYSTECTOMY      HYSTERECTOMY      SHOULDER ARTHROSCOPY      right    TONSILLECTOMY         Review of patient's allergies indicates:   Allergen Reactions    Levofloxacin      Other reaction(s): fainting spells       No current facility-administered medications on file prior to encounter.      Current Outpatient Medications on File Prior to Encounter   Medication Sig    atorvastatin (LIPITOR) 10 MG tablet TK 1 T PO QD    duloxetine (CYMBALTA) 60 MG capsule Take by mouth.     levothyroxine (SYNTHROID) 75 MCG tablet TAKE 1 TABLET  BEFORE BREAKFAST AS DIRECTED    omeprazole-sodium bicarbonate (ZEGERID) 20-1.1 mg-gram Cap Take 60 mg by mouth once daily.     topiramate (TOPAMAX) 100 MG tablet Take 100 mg by mouth 2 (two) times daily.     codeine-butalbital-ASA-caffeine (BUTALBITAL COMPOUND W/CODEINE) capsule Take by mouth.    erythromycin (ROMYCIN) ophthalmic ointment     HYDROcodone-acetaminophen (NORCO)  mg per tablet hydrocodone 10 mg-acetaminophen 325 mg tablet    LINZESS 72 mcg Cap TK 1 C PO QD    oxybutynin (DITROPAN-XL) 5 MG TR24 Take 1 tablet (5 mg total) by mouth once daily.    tobramycin sulfate 0.3% (TOBREX) 0.3 % ophthalmic solution      Family History     Problem Relation (Age of Onset)    Cancer Mother (51)    Stroke Father        Tobacco Use    Smoking status: Never Smoker    Smokeless tobacco: Never Used   Substance and Sexual Activity    Alcohol use: Yes     Comment: occasional    Drug use: No    Sexual activity: Never     Review of Systems   Constitutional: Negative for appetite change, chills, diaphoresis, fatigue, fever and unexpected weight change.   HENT: Negative.    Eyes: Negative.    Respiratory: Positive for shortness of breath. Negative for wheezing.    Cardiovascular: Positive for chest pain. Negative for palpitations and leg swelling.   Gastrointestinal: Positive for constipation. Negative for abdominal pain, nausea and vomiting.   Endocrine: Negative.    Musculoskeletal: Positive for back pain and neck pain.   Allergic/Immunologic: Negative.    Neurological: Negative.    Hematological: Negative.    Psychiatric/Behavioral: Negative.      Objective:     Vital Signs (Most Recent):  Temp: 97.9 °F (36.6 °C) (09/29/19 2314)  Pulse: 70 (09/30/19 0131)  Resp: (!) 27 (09/30/19 0131)  BP: (!) 151/63 (09/30/19 0131)  SpO2: 98 % (09/30/19 0131) Vital Signs (24h Range):  Temp:  [97.9 °F (36.6 °C)] 97.9 °F (36.6 °C)  Pulse:  [64-74] 70  Resp:  [14-36]  27  SpO2:  [98 %-100 %] 98 %  BP: (151-179)/(63-79) 151/63     Weight: 64.9 kg (143 lb 1.3 oz)  Body mass index is 23.09 kg/m².    Physical Exam   Constitutional: She is oriented to person, place, and time. She appears well-developed and well-nourished.   HENT:   Head: Normocephalic and atraumatic.   Eyes: Conjunctivae and EOM are normal.   Neck: Normal range of motion. Neck supple.   Cardiovascular: Normal rate, regular rhythm and normal heart sounds. Exam reveals no gallop.   No murmur heard.  Pulmonary/Chest: Effort normal and breath sounds normal. She has no wheezes. She has no rales. She exhibits no tenderness.   Abdominal: Soft. Bowel sounds are normal.   Genitourinary: Vagina normal.   Musculoskeletal: Normal range of motion. She exhibits no edema or tenderness.   Neurological: She is alert and oriented to person, place, and time.   Skin: Skin is warm and dry. Capillary refill takes less than 2 seconds.   Psychiatric: She has a normal mood and affect.   Vitals reviewed.        CRANIAL NERVES     CN III, IV, VI   Extraocular motions are normal.        Significant Labs:   CBC:   Recent Labs   Lab 09/29/19  2339   WBC 8.22   HGB 11.1*   HCT 34.7*        CMP:   Recent Labs   Lab 09/29/19  2339      K 4.0      CO2 25   GLU 94   BUN 21   CREATININE 0.6   CALCIUM 9.1   PROT 6.6   ALBUMIN 4.0   BILITOT 0.5   ALKPHOS 56   AST 17   ALT 17   ANIONGAP 9   EGFRNONAA >60.0     Cardiac Markers: No results for input(s): CKMB, MYOGLOBIN, BNP, TROPISTAT in the last 48 hours.  Coagulation:   Recent Labs   Lab 09/29/19  2339   INR 1.0     Troponin:   Recent Labs   Lab 09/29/19 2339   TROPONINI <0.030     All pertinent labs within the past 24 hours have been reviewed.    Significant Imaging:   CXR: No obvious infiltrates or overt heart failure  EKG:  Sinus rhythm, no ST elevation    Assessment/Plan:     * Chest pain  Cardiac monitor for arrhythmia  Trend troponin  Aspirin and nitroglycerin  She has known  nonobstructive coronary artery disease, will proceed with a nuclear stress test  Consult Cardiology        Hypertension  Likely due to pain  Monitor      Hypothyroid  Resume home medication      Hyperlipidemia  Resume statin        VTE Risk Mitigation (From admission, onward)         Ordered     Place sequential compression device  Until discontinued      09/30/19 0149     Place sequential compression device  Until discontinued      09/30/19 0149     IP VTE HIGH RISK PATIENT  Once      09/30/19 0149                   TANNA Ervin  Department of Hospital Medicine   Counts include 234 beds at the [facility]

## 2019-09-30 NOTE — CONSULTS
[facility]  Cardiology  Consult Note    Patient Name: Gala Gaines  MRN: [id number]  Admission Date: 2019  Hospital Length of Stay: 0 days  Code Status: Full Code   Attending Provider: No att. providers found   Consulting Provider: Savita Orozco NP  Primary Care Physician: Abdirahman Calle MD  Principal Problem:Chest pain    Patient information was obtained from patient, past medical records and ER records.     Consults  Subjective:     Chief Complaint:     Patient presents complaining of chest discomfort that is been ongoing since Wednesday.  Patient states on Tuesday she had lifted quite a few bags of clothes and thought that maybe that is why she was having discomfort in her chest however today was different because it started to radiate into her neck.  Patient has a known history of coronary artery disease.  Patient states the pain is about 8/10.  Movement does make it worse however she cannot reproduce the neck pain.  She denies any nausea vomiting or diaphoresis.    HPI: Ms. Gaines is a 73 year old female who presented to the ER with complaints of left sided chest discomfort that began a few days after she had been lifting heavy bags of clothes.  The patient reports she initially thought it was musculoskeletal however because the pain began to radiate to the left side of her neck and her blood pressure was high, she became concerned over the possibility of having a heart blockage.  She has a known history of non obstructive coronary artery disease.  Patient denies any shortness of breath, nausea, or diaphoresis.    Past Medical History:   Diagnosis Date    Coronary artery disease     Migraine headache     Thyroid disease        Past Surgical History:   Procedure Laterality Date    APPENDECTOMY       SECTION      CHOLECYSTECTOMY      HYSTERECTOMY      SHOULDER ARTHROSCOPY      right    TONSILLECTOMY         Review of patient's allergies indicates:   Allergen Reactions     Levofloxacin      Other reaction(s): fainting spells    Nuts  [tree nut]        No current facility-administered medications on file prior to encounter.      Current Outpatient Medications on File Prior to Encounter   Medication Sig    atorvastatin (LIPITOR) 10 MG tablet TK 1 T PO QD    duloxetine (CYMBALTA) 60 MG capsule Take by mouth.    levothyroxine (SYNTHROID) 75 MCG tablet TAKE 1 TABLET  BEFORE BREAKFAST AS DIRECTED    omeprazole-sodium bicarbonate (ZEGERID) 20-1.1 mg-gram Cap Take 60 mg by mouth once daily.     topiramate (TOPAMAX) 100 MG tablet Take 100 mg by mouth 2 (two) times daily.     codeine-butalbital-ASA-caffeine (BUTALBITAL COMPOUND W/CODEINE) capsule Take by mouth.    erythromycin (ROMYCIN) ophthalmic ointment     HYDROcodone-acetaminophen (NORCO)  mg per tablet hydrocodone 10 mg-acetaminophen 325 mg tablet    LINZESS 72 mcg Cap TK 1 C PO QD    oxybutynin (DITROPAN-XL) 5 MG TR24 Take 1 tablet (5 mg total) by mouth once daily.    tobramycin sulfate 0.3% (TOBREX) 0.3 % ophthalmic solution      Family History     Problem Relation (Age of Onset)    Cancer Mother (51)    Stroke Father        Tobacco Use    Smoking status: Never Smoker    Smokeless tobacco: Never Used   Substance and Sexual Activity    Alcohol use: Yes     Comment: occasional    Drug use: No    Sexual activity: Never     ROS     Review of Systems   Constitution: Negative for diaphoresis and fever.   HENT: Negative for nosebleeds.    Cardiovascular:  Positive for left-sided chest pain that radiates to the left neck; negative for dyspnea on exertion, leg swelling and palpitations.   Respiratory: Negative for shortness of breath and wheezing.    Hematologic/Lymphatic: Negative for bleeding problem. Does not bruise/bleed easily.   Skin: Negative for color change and rash.   Musculoskeletal: Negative for falls and myalgias.   Gastrointestinal: Negative for hematemesis and hematochezia.   Genitourinary: Negative for  hematuria.   Neurological: Negative for dizziness and light-headedness.   Psychiatric/Behavioral: Negative for altered mental status and memory loss.     Objective:     Vital Signs (Most Recent):  Temp: (off the unit) (09/30/19 0700)  Pulse: 81 (09/30/19 1123)  Resp: 16 (09/30/19 1123)  BP: 129/73 (09/30/19 0349)  SpO2: 98 % (09/30/19 1123) Vital Signs (24h Range):  Temp:  [97.4 °F (36.3 °C)-97.9 °F (36.6 °C)] 97.5 °F (36.4 °C)  Pulse:  [64-81] 81  Resp:  [14-36] 16  SpO2:  [98 %-100 %] 98 %  BP: (129-179)/(63-79) 129/73     Weight: 64.8 kg (142 lb 15.5 oz)  Body mass index is 23.43 kg/m².    SpO2: 98 %  O2 Device (Oxygen Therapy): room air    No intake or output data in the 24 hours ending 09/30/19 1300    Lines/Drains/Airways     None                 Physical Exam     HEENT: Normocephalic, atraumatic,   PERRL, Conjunctiva pink, no scleral icterus.   NECK:  No JVD no carotid bruit  CVS: S1S2+, RRR, no murmurs, rubs or gallops, JVP: Normal.  LUNGS: Clear  ABDOMEN: Soft, NT, BS+  EXTREMITIES: No cyanosis, clubbing or edema  Gu: No arellano catheter, denies dysuria, no hematuria  NEURO: AAO X 3.   PSY :  Normal affect  MUSC: Left sided chest tenderness    Significant Labs:   Recent Lab Results       09/30/19  1116        Troponin I <0.030           Significant Imaging:     Impression:       1.   No evidence of pharmacologically induced reversible ischemia or infarct.    2.   Normal left ventricular wall motion.    3.   Calculated ejection fraction of 76 %.     Impression:       Small irregular densities projecting over both lower lobes could reflect costochondral calcification versus nodular airspace opacities.  If signs and symptoms of pulmonary infection are present, consider follow-up PA and lateral chest radiography.         Assessment and Plan:     1. Atypical chest pain  2. Hypertension  3. Hx of nonobstructive CAD  4. Hyperlipidemia  5. Hypothyroidism    PLAN:    1.  Stress test is negative for signs of reversible  ischemia, and troponins have been negative x3 sets.  2.  Chest discomfort appears to be musculoskeletal possibly from a strained muscle. Patient has Norco PRN as a home medication. May use this as prescribed in addition to ice PRN for discomfort.   3.  Elevated blood pressure is likely response to pain as well as anxiety over chest pain.  Blood pressure has improved.  4.  Patient is stable for discharge from cardiac perspective.  May follow up outpatient post hospitalization.    Active Diagnoses:    Diagnosis Date Noted POA    Hypertension [I10] 09/30/2019 Yes    Hyperlipidemia [E78.5] 06/01/2012 Yes    Hypothyroid [E03.9] 06/01/2012 Yes      Problems Resolved During this Admission:    Diagnosis Date Noted Date Resolved POA    PRINCIPAL PROBLEM:  Chest pain [R07.9] 09/30/2019 09/30/2019 Yes       VTE Risk Mitigation (From admission, onward)         Ordered     Place sequential compression device  Until discontinued      09/30/19 0149     Place sequential compression device  Until discontinued      09/30/19 0149     IP VTE HIGH RISK PATIENT  Once      09/30/19 0149                Thank you for your consult. I will sign off. Please contact us if you have any additional questions.   Patient was discharged prior to my evaluation as this cardiac testing was negative and I will be seeing her in the office in follow-up.    Savita Orozco NP  Cardiology   [facility]

## 2019-09-30 NOTE — PLAN OF CARE
09/30/19 1143   Final Note   Assessment Type Final Discharge Note   Anticipated Discharge Disposition Home

## 2019-09-30 NOTE — PLAN OF CARE
09/30/19 1140   RIVER Message   Medicare Outpatient and Observation Notification regarding financial responsibility Given to patient/caregiver;Explained to patient/caregiver;Signed/date by patient/caregiver   Date RIVER was signed 09/30/19   Time RIVER was signed 1140

## 2019-09-30 NOTE — DISCHARGE SUMMARY
[facility] Medicine  Discharge Summary      Patient Name: Gala Gaines  MRN: [id number]  Admission Date: 9/29/2019  Hospital Length of Stay: 0 days  Discharge Date and Time:  09/30/2019 11:33 AM  Attending Physician: Gonzalo Pappas MD   Discharging Provider: Gonzalo Pappas MD  Primary Care Provider: Abdirahman Calle MD      HPI:   The patient is a 73-year-old female with history of nonobstructive coronary artery disease, hypothyroidism, GERD, migraine.  She presented to the emergency department with chest pain. He states that she was lifting a few bag of clothes about 5 days ago. She started having severe constant left sided chest pain on the following day. She describes pain as as hard pain, radiated to back. She denies shortness of breath, nausea, vomiting, or diaphoresis. Pain is slightly improved with pain medication which she occasionally takes for migraine headache. She has been having pain in her neck since yesterday. She gets very concern and presented to the ED.     Initial work up in the ED is unremarkable.  Troponin is negative.  EKG showed no acute changes.  She is however noted to be slightly hypertensive with systolic blood pressures in the 150-160 range.    * No surgery found *      Hospital Course:   Patient was admitted for chest pain. Serial cardiac enzymes were negative. Patient had a nuclear stress test that found no evidence of reversible ischemia or infarct. Calculated EF 76%. Patient's symptoms resolved during hospitalization. Patient was stable at discharge with instructions to follow up with cardiology and PCP.    General: Patient resting comfortably in no acute distress. Appears as stated age. Calm  Eyes: EOM intact. No conjunctivae injection. No scleral icterus.  ENT: Hearing grossly intact. No discharge from ears. No nasal discharge.   CVS: RRR. No LE edema BL.  Lungs: CTA BL, no wheezing or crackles. Good breath sounds. No accessory muscle use. No acute  respiratory distress  Neuro: AOx3. GCS 15. Cranial nerves grossly intact. Moves all extremities equally. Follows commands. Responds appropriately      Consults:   Consults (From admission, onward)        Status Ordering Provider     Inpatient consult to Cardiology  Once     Provider:  Hebert Zamora MD    Acknowledged ZENY BIANCHI          No new Assessment & Plan notes have been filed under this hospital service since the last note was generated.  Service: Hospital Medicine    Final Active Diagnoses:    Diagnosis Date Noted POA    Hypertension [I10] 09/30/2019 Yes    Hyperlipidemia [E78.5] 06/01/2012 Yes    Hypothyroid [E03.9] 06/01/2012 Yes      Problems Resolved During this Admission:    Diagnosis Date Noted Date Resolved POA    PRINCIPAL PROBLEM:  Chest pain [R07.9] 09/30/2019 09/30/2019 Yes       Discharged Condition: stable    Disposition: Home or Self Care    Follow Up:  Follow-up Information     Hebert Zamora MD In 2 weeks.    Specialty:  Cardiology  Why:  For check up s/p hospital discharge  Contact information:  [address]  [address]  [place] [id number] [phone]             Abdirahman Calle MD In 1 week.    Specialty:  Family Medicine  Why:  For check up s/p hospital discharge  Contact information:  [address]  [place] [address]  [phone]                 Patient Instructions:      Diet Cardiac     Notify your health care provider if you experience any of the following:  temperature >100.4     Notify your health care provider if you experience any of the following:  persistent nausea and vomiting or diarrhea     Notify your health care provider if you experience any of the following:  severe uncontrolled pain     Notify your health care provider if you experience any of the following:  redness, tenderness, or signs of infection (pain, swelling, redness, odor or green/yellow discharge around incision site)     Notify your health care provider if you experience any of  the following:  difficulty breathing or increased cough     Notify your health care provider if you experience any of the following:  severe persistent headache     Notify your health care provider if you experience any of the following:  worsening rash     Notify your health care provider if you experience any of the following:  persistent dizziness, light-headedness, or visual disturbances     Notify your health care provider if you experience any of the following:  increased confusion or weakness     Activity as tolerated       Significant Diagnostic Studies: Labs:   Lipid Panel   Lab Results   Component Value Date    CHOL 175 08/23/2019    HDL 62 08/23/2019    LDLCALC 99.0 08/23/2019    TRIG 70 08/23/2019    CHOLHDL 35.4 08/23/2019   , Troponin   Recent Labs   Lab 09/30/19  0451   TROPONINI <0.030    and A1C:   Recent Labs   Lab 08/23/19  1000   HGBA1C 5.3       Pending Diagnostic Studies:     Procedure Component Value Units Date/Time    Troponin I [[id number]] Collected:  09/30/19 1116    Order Status:  Sent Lab Status:  In process Updated:  09/30/19 1128    Specimen:  Blood          Medications:  Reconciled Home Medications:      Medication List      CONTINUE taking these medications    atorvastatin 10 MG tablet  Commonly known as:  LIPITOR  TK 1 T PO QD     BUTALBITAL COMPOUND W/CODEINE 84--40 mg Cap  Generic drug:  codeine-butalbital-ASA-caffeine  Take by mouth.     CYMBALTA 60 MG capsule  Generic drug:  DULoxetine  Take by mouth.     erythromycin ophthalmic ointment  Commonly known as:  ROMYCIN     HYDROcodone-acetaminophen  mg per tablet  Commonly known as:  NORCO  hydrocodone 10 mg-acetaminophen 325 mg tablet     levothyroxine 75 MCG tablet  Commonly known as:  SYNTHROID  TAKE 1 TABLET  BEFORE BREAKFAST AS DIRECTED     LINZESS 72 mcg Cap capsule  Generic drug:  linaCLOtide  TK 1 C PO QD     oxybutynin 5 MG Tr24  Commonly known as:  DITROPAN-XL  Take 1 tablet (5 mg total) by mouth once daily.      tobramycin sulfate 0.3% 0.3 % ophthalmic solution  Commonly known as:  TOBREX     TOPAMAX 100 MG tablet  Generic drug:  topiramate  Take 100 mg by mouth 2 (two) times daily.     ZEGERID 20-1.1 mg-gram Cap  Generic drug:  omeprazole-sodium bicarbonate  Take 60 mg by mouth once daily.            Indwelling Lines/Drains at time of discharge:   Lines/Drains/Airways     None                 Time spent on the discharge of patient: 25 minutes  Patient was seen and examined on the date of discharge and determined to be suitable for discharge.         Gonzalo Pappas MD  Department of Hospital Medicine  [facility]  Date of service: 09/30/2019 11:33 AM

## 2019-09-30 NOTE — PROGRESS NOTES
@  08:05  PATIENT INJECTED WITH 267mCi Tc99m MYOVIEW IV FOR STRESS IMAGES.    LEXISCAN STRESS.    RELEASE NPO STATUS FROM NUCLEAR MEDICINE.      PADMA VAUGHN

## 2019-09-30 NOTE — ASSESSMENT & PLAN NOTE
Cardiac monitor for arrhythmia  Trend troponin  Aspirin and nitroglycerin  She has known nonobstructive coronary artery disease, will proceed with a nuclear stress test  Consult Cardiology

## 2019-09-30 NOTE — SUBJECTIVE & OBJECTIVE
No past medical history on file.    Past Surgical History:   Procedure Laterality Date    APPENDECTOMY       SECTION      CHOLECYSTECTOMY      HYSTERECTOMY      SHOULDER ARTHROSCOPY      right    TONSILLECTOMY         Review of patient's allergies indicates:   Allergen Reactions    Levofloxacin      Other reaction(s): fainting spells       No current facility-administered medications on file prior to encounter.      Current Outpatient Medications on File Prior to Encounter   Medication Sig    atorvastatin (LIPITOR) 10 MG tablet TK 1 T PO QD    duloxetine (CYMBALTA) 60 MG capsule Take by mouth.    levothyroxine (SYNTHROID) 75 MCG tablet TAKE 1 TABLET  BEFORE BREAKFAST AS DIRECTED    omeprazole-sodium bicarbonate (ZEGERID) 20-1.1 mg-gram Cap Take 60 mg by mouth once daily.     topiramate (TOPAMAX) 100 MG tablet Take 100 mg by mouth 2 (two) times daily.     codeine-butalbital-ASA-caffeine (BUTALBITAL COMPOUND W/CODEINE) capsule Take by mouth.    erythromycin (ROMYCIN) ophthalmic ointment     HYDROcodone-acetaminophen (NORCO)  mg per tablet hydrocodone 10 mg-acetaminophen 325 mg tablet    LINZESS 72 mcg Cap TK 1 C PO QD    oxybutynin (DITROPAN-XL) 5 MG TR24 Take 1 tablet (5 mg total) by mouth once daily.    tobramycin sulfate 0.3% (TOBREX) 0.3 % ophthalmic solution      Family History     Problem Relation (Age of Onset)    Cancer Mother (51)    Stroke Father        Tobacco Use    Smoking status: Never Smoker    Smokeless tobacco: Never Used   Substance and Sexual Activity    Alcohol use: Yes     Comment: occasional    Drug use: No    Sexual activity: Never     Review of Systems   Constitutional: Negative for appetite change, chills, diaphoresis, fatigue, fever and unexpected weight change.   HENT: Negative.    Eyes: Negative.    Respiratory: Positive for shortness of breath. Negative for wheezing.    Cardiovascular: Positive for chest pain. Negative for palpitations and leg swelling.    Gastrointestinal: Positive for constipation. Negative for abdominal pain, nausea and vomiting.   Endocrine: Negative.    Musculoskeletal: Positive for back pain and neck pain.   Allergic/Immunologic: Negative.    Neurological: Negative.    Hematological: Negative.    Psychiatric/Behavioral: Negative.      Objective:     Vital Signs (Most Recent):  Temp: 97.9 °F (36.6 °C) (09/29/19 2314)  Pulse: 70 (09/30/19 0131)  Resp: (!) 27 (09/30/19 0131)  BP: (!) 151/63 (09/30/19 0131)  SpO2: 98 % (09/30/19 0131) Vital Signs (24h Range):  Temp:  [97.9 °F (36.6 °C)] 97.9 °F (36.6 °C)  Pulse:  [64-74] 70  Resp:  [14-36] 27  SpO2:  [98 %-100 %] 98 %  BP: (151-179)/(63-79) 151/63     Weight: 64.9 kg (143 lb 1.3 oz)  Body mass index is 23.09 kg/m².    Physical Exam   Constitutional: She is oriented to person, place, and time. She appears well-developed and well-nourished.   HENT:   Head: Normocephalic and atraumatic.   Eyes: Conjunctivae and EOM are normal.   Neck: Normal range of motion. Neck supple.   Cardiovascular: Normal rate, regular rhythm and normal heart sounds. Exam reveals no gallop.   No murmur heard.  Pulmonary/Chest: Effort normal and breath sounds normal. She has no wheezes. She has no rales. She exhibits no tenderness.   Abdominal: Soft. Bowel sounds are normal.   Genitourinary: Vagina normal.   Musculoskeletal: Normal range of motion. She exhibits no edema or tenderness.   Neurological: She is alert and oriented to person, place, and time.   Skin: Skin is warm and dry. Capillary refill takes less than 2 seconds.   Psychiatric: She has a normal mood and affect.   Vitals reviewed.        CRANIAL NERVES     CN III, IV, VI   Extraocular motions are normal.        Significant Labs:   CBC:   Recent Labs   Lab 09/29/19  2339   WBC 8.22   HGB 11.1*   HCT 34.7*        CMP:   Recent Labs   Lab 09/29/19  2339      K 4.0      CO2 25   GLU 94   BUN 21   CREATININE 0.6   CALCIUM 9.1   PROT 6.6   ALBUMIN 4.0    BILITOT 0.5   ALKPHOS 56   AST 17   ALT 17   ANIONGAP 9   EGFRNONAA >60.0     Cardiac Markers: No results for input(s): CKMB, MYOGLOBIN, BNP, TROPISTAT in the last 48 hours.  Coagulation:   Recent Labs   Lab 09/29/19  2339   INR 1.0     Troponin:   Recent Labs   Lab 09/29/19  2339   TROPONINI <0.030     All pertinent labs within the past 24 hours have been reviewed.    Significant Imaging:   CXR: No obvious infiltrates or overt heart failure  EKG:  Sinus rhythm, no ST elevation

## 2019-09-30 NOTE — ED TRIAGE NOTES
Pt complains of chest pain since Wed. Pt states that she had been lifting and thought that was the reason for the pain. Pt states that tonight the pain started to go into the jaw. States pain is in left side of chest.

## 2019-09-30 NOTE — HOSPITAL COURSE
Patient was admitted for chest pain. Serial cardiac enzymes were negative. Patient had a nuclear stress test that found no evidence of reversible ischemia or infarct. Calculated EF 76%. Patient's symptoms resolved during hospitalization. Patient was stable at discharge with instructions to follow up with cardiology and PCP.    General: Patient resting comfortably in no acute distress. Appears as stated age. Calm  Eyes: EOM intact. No conjunctivae injection. No scleral icterus.  ENT: Hearing grossly intact. No discharge from ears. No nasal discharge.   CVS: RRR. No LE edema BL.  Lungs: CTA BL, no wheezing or crackles. Good breath sounds. No accessory muscle use. No acute respiratory distress  Neuro: AOx3. GCS 15. Cranial nerves grossly intact. Moves all extremities equally. Follows commands. Responds appropriately

## 2019-09-30 NOTE — PROGRESS NOTES
@  10:31  OBTAINED STRESS IMAGES.    @  11:00  NUCLEAR MEDICINE MYOPERFUSION STUDY COMPLETED.    MARIAELENA VAUGHN

## 2019-09-30 NOTE — HPI
The patient is a 73-year-old female with history of nonobstructive coronary artery disease, hypothyroidism, GERD, migraine.  She presented to the emergency department with chest pain. He states that she was lifting a few bag of clothes about 5 days ago. She started having severe constant left sided chest pain on the following day. She describes pain as as hard pain, radiated to back. She denies shortness of breath, nausea, vomiting, or diaphoresis. Pain is slightly improved with pain medication which she occasionally takes for migraine headache. She has been having pain in her neck since yesterday. She gets very concern and presented to the ED.     Initial work up in the ED is unremarkable.  Troponin is negative.  EKG showed no acute changes.  She is however noted to be slightly hypertensive with systolic blood pressures in the 150-160 range.

## 2019-11-05 DIAGNOSIS — R10.13 EPIGASTRIC PAIN: Primary | ICD-10-CM

## 2019-11-07 ENCOUNTER — HOSPITAL ENCOUNTER (OUTPATIENT)
Dept: RADIOLOGY | Facility: HOSPITAL | Age: 74
Discharge: HOME | End: 2019-11-07
Attending: INTERNAL MEDICINE
Payer: MEDICARE

## 2019-11-07 DIAGNOSIS — R10.13 EPIGASTRIC PAIN: ICD-10-CM

## 2019-11-07 PROCEDURE — 76700 US EXAM ABDOM COMPLETE: CPT | Mod: TC,PO

## 2020-04-27 ENCOUNTER — TELEPHONE (OUTPATIENT)
Dept: FAMILY MEDICINE | Facility: CLINIC | Age: 75
End: 2020-04-27

## 2020-04-27 NOTE — TELEPHONE ENCOUNTER
----- Message from Nils Kahn sent at 4/27/2020 10:35 AM CDT -----  Type:  Sooner Apoointment Request    Caller is requesting a sooner appointment.  Caller declined first available appointment listed below.  Caller will not accept being placed on the waitlist and is requesting a message be sent to doctor.    Name of Caller:  self  When is the first available appointment?    Symptoms: Best Call Back Number:   [phone]-Additional Information: Patient request reschedule morning appointment in the month of July.

## 2020-05-29 ENCOUNTER — TELEPHONE (OUTPATIENT)
Dept: FAMILY MEDICINE | Facility: CLINIC | Age: 75
End: 2020-05-29

## 2020-05-29 NOTE — TELEPHONE ENCOUNTER
----- Message from America Simon sent at 5/29/2020  4:07 PM CDT -----  Contact: Chela w/ [employer]  Type: Needs medical advice      Who Called; Chela Chatterjee Call Back Number:   [phone]   Additional Information:   Following up on request for accuheck meter and strips, request was sent to your office on 5/22/20

## 2020-07-24 ENCOUNTER — PES CALL (OUTPATIENT)
Dept: ADMINISTRATIVE | Facility: CLINIC | Age: 75
End: 2020-07-24

## 2020-09-24 RX ORDER — ATORVASTATIN CALCIUM 10 MG/1
TABLET, FILM COATED ORAL
Qty: 90 TABLET | Refills: 3 | Status: SHIPPED | OUTPATIENT
Start: 2020-09-24 | End: 2021-06-29

## 2020-10-21 ENCOUNTER — TELEPHONE (OUTPATIENT)
Dept: FAMILY MEDICINE | Facility: CLINIC | Age: 75
End: 2020-10-21

## 2020-10-21 NOTE — TELEPHONE ENCOUNTER
----- Message from Jay Moraes sent at 10/21/2020  3:28 PM CDT -----  Regarding: Flu injection  Contact: patient  Patient want to know if she can get flu injection when she come in for appointment on November 30th any questions please call back at [phone] (home)     Case number [id number]

## 2020-10-22 ENCOUNTER — IMMUNIZATION (OUTPATIENT)
Dept: PRIMARY CARE CLINIC | Facility: CLINIC | Age: 75
End: 2020-10-22
Payer: MEDICARE

## 2020-10-22 DIAGNOSIS — Z12.31 ENCOUNTER FOR SCREENING MAMMOGRAM FOR MALIGNANT NEOPLASM OF BREAST: Primary | ICD-10-CM

## 2020-10-22 PROCEDURE — G0008 ADMIN INFLUENZA VIRUS VAC: HCPCS | Mod: S$GLB,,, | Performed by: FAMILY MEDICINE

## 2020-10-22 PROCEDURE — G0008 FLU VACCINE - QUADRIVALENT - ADJUVANTED: ICD-10-PCS | Mod: S$GLB,,, | Performed by: FAMILY MEDICINE

## 2020-10-22 PROCEDURE — 90694 VACC AIIV4 NO PRSRV 0.5ML IM: CPT | Mod: S$GLB,,, | Performed by: FAMILY MEDICINE

## 2020-10-22 PROCEDURE — 90694 FLU VACCINE - QUADRIVALENT - ADJUVANTED: ICD-10-PCS | Mod: S$GLB,,, | Performed by: FAMILY MEDICINE

## 2021-01-13 ENCOUNTER — HOSPITAL ENCOUNTER (OUTPATIENT)
Dept: RADIOLOGY | Facility: HOSPITAL | Age: 76
Discharge: HOME | End: 2021-01-13
Attending: SURGERY
Payer: MEDICARE

## 2021-01-13 DIAGNOSIS — Z12.31 ENCOUNTER FOR SCREENING MAMMOGRAM FOR MALIGNANT NEOPLASM OF BREAST: ICD-10-CM

## 2021-01-13 PROCEDURE — 77067 SCR MAMMO BI INCL CAD: CPT | Mod: TC,PO

## 2021-01-22 ENCOUNTER — PATIENT MESSAGE (OUTPATIENT)
Dept: ADMINISTRATIVE | Facility: OTHER | Age: 76
End: 2021-01-22

## 2021-04-05 ENCOUNTER — PATIENT MESSAGE (OUTPATIENT)
Dept: ADMINISTRATIVE | Facility: HOSPITAL | Age: 76
End: 2021-04-05

## 2021-04-27 ENCOUNTER — OFFICE VISIT (OUTPATIENT)
Dept: HOME HEALTH SERVICES | Facility: CLINIC | Age: 76
End: 2021-04-27
Payer: MEDICARE

## 2021-04-27 ENCOUNTER — TELEPHONE (OUTPATIENT)
Dept: FAMILY MEDICINE | Facility: CLINIC | Age: 76
End: 2021-04-27

## 2021-04-27 VITALS
WEIGHT: 145 LBS | DIASTOLIC BLOOD PRESSURE: 74 MMHG | SYSTOLIC BLOOD PRESSURE: 116 MMHG | HEIGHT: 65 IN | HEART RATE: 85 BPM | BODY MASS INDEX: 24.16 KG/M2 | TEMPERATURE: 98 F | OXYGEN SATURATION: 98 %

## 2021-04-27 DIAGNOSIS — K59.00 CONSTIPATION, UNSPECIFIED CONSTIPATION TYPE: ICD-10-CM

## 2021-04-27 DIAGNOSIS — K21.9 GASTROESOPHAGEAL REFLUX DISEASE WITHOUT ESOPHAGITIS: ICD-10-CM

## 2021-04-27 DIAGNOSIS — E78.5 HYPERLIPIDEMIA, UNSPECIFIED HYPERLIPIDEMIA TYPE: ICD-10-CM

## 2021-04-27 DIAGNOSIS — I70.0 ATHEROSCLEROSIS OF ABDOMINAL AORTA: ICD-10-CM

## 2021-04-27 DIAGNOSIS — Z00.00 ENCOUNTER FOR PREVENTIVE HEALTH EXAMINATION: Primary | ICD-10-CM

## 2021-04-27 DIAGNOSIS — G43.909 MIGRAINE WITHOUT STATUS MIGRAINOSUS, NOT INTRACTABLE, UNSPECIFIED MIGRAINE TYPE: ICD-10-CM

## 2021-04-27 DIAGNOSIS — E03.9 HYPOTHYROIDISM, UNSPECIFIED TYPE: ICD-10-CM

## 2021-04-27 DIAGNOSIS — I10 HYPERTENSION, UNSPECIFIED TYPE: ICD-10-CM

## 2021-04-27 DIAGNOSIS — E03.9 HYPOTHYROIDISM, UNSPECIFIED TYPE: Primary | ICD-10-CM

## 2021-04-27 PROBLEM — G89.29 CHRONIC HEADACHES: Status: RESOLVED | Noted: 2021-04-27 | Resolved: 2021-04-27

## 2021-04-27 PROBLEM — R51.9 CHRONIC HEADACHES: Status: RESOLVED | Noted: 2021-04-27 | Resolved: 2021-04-27

## 2021-04-27 PROBLEM — G89.29 CHRONIC HEADACHES: Status: ACTIVE | Noted: 2021-04-27

## 2021-04-27 PROBLEM — R51.9 CHRONIC HEADACHES: Status: ACTIVE | Noted: 2021-04-27

## 2021-04-27 PROCEDURE — 3288F FALL RISK ASSESSMENT DOCD: CPT | Mod: CPTII,S$GLB,, | Performed by: NURSE PRACTITIONER

## 2021-04-27 PROCEDURE — 1101F PR PT FALLS ASSESS DOC 0-1 FALLS W/OUT INJ PAST YR: ICD-10-PCS | Mod: CPTII,S$GLB,, | Performed by: NURSE PRACTITIONER

## 2021-04-27 PROCEDURE — G0439 PPPS, SUBSEQ VISIT: HCPCS | Mod: S$GLB,,, | Performed by: NURSE PRACTITIONER

## 2021-04-27 PROCEDURE — G0439 PR MEDICARE ANNUAL WELLNESS SUBSEQUENT VISIT: ICD-10-PCS | Mod: S$GLB,,, | Performed by: NURSE PRACTITIONER

## 2021-04-27 PROCEDURE — 1101F PT FALLS ASSESS-DOCD LE1/YR: CPT | Mod: CPTII,S$GLB,, | Performed by: NURSE PRACTITIONER

## 2021-04-27 PROCEDURE — 3288F PR FALLS RISK ASSESSMENT DOCUMENTED: ICD-10-PCS | Mod: CPTII,S$GLB,, | Performed by: NURSE PRACTITIONER

## 2021-04-27 RX ORDER — ASPIRIN 81 MG/1
81 TABLET ORAL DAILY
COMMUNITY

## 2021-04-27 RX ORDER — DM/P-EPHED/ACETAMINOPH/DOXYLAM
LIQUID (ML) ORAL
COMMUNITY

## 2021-04-27 RX ORDER — EPINEPHRINE 0.22MG
100 AEROSOL WITH ADAPTER (ML) INHALATION DAILY
COMMUNITY

## 2021-05-06 ENCOUNTER — PATIENT MESSAGE (OUTPATIENT)
Dept: RESEARCH | Facility: HOSPITAL | Age: 76
End: 2021-05-06

## 2021-10-07 ENCOUNTER — PATIENT MESSAGE (OUTPATIENT)
Dept: ADMINISTRATIVE | Facility: HOSPITAL | Age: 76
End: 2021-10-07

## 2021-11-12 ENCOUNTER — TELEPHONE (OUTPATIENT)
Dept: CARDIOLOGY | Facility: CLINIC | Age: 76
End: 2021-11-12
Payer: MEDICARE

## 2021-11-15 ENCOUNTER — TELEPHONE (OUTPATIENT)
Dept: CARDIOLOGY | Facility: CLINIC | Age: 76
End: 2021-11-15
Payer: MEDICARE

## 2021-11-29 DIAGNOSIS — Z12.31 ENCOUNTER FOR SCREENING MAMMOGRAM FOR MALIGNANT NEOPLASM OF BREAST: Primary | ICD-10-CM

## 2021-12-08 ENCOUNTER — TELEPHONE (OUTPATIENT)
Dept: CARDIOLOGY | Facility: CLINIC | Age: 76
End: 2021-12-08
Payer: MEDICARE

## 2021-12-08 NOTE — TELEPHONE ENCOUNTER
----- Message from Derick Horne sent at 12/8/2021  3:05 PM CST -----  Type: Needs Medical Advice  Who Called:  pt    Best Call Back Number: [phone]   Additional Information: Pt sts her clearance was sent with the wrong date.  Needs to be within the 30 days.  Needed to be dated after 11/15.  Please correct and resend.  Please advise -- Thank you

## 2021-12-08 NOTE — LETTER
2021    Gala Gaines  [address]  [place] [address]             [facility] - Cardiology  [address], [address]  [place] [address]  Phone: [phone]  Fax: [phone] Patient: Gala Gaines  : 1945  Referring Doctor: Dr. Reno  Procedure: Cataract Left Eye 12/15/21, Right Eye (3 weeks later)       Current Outpatient Medications   Medication Sig    acai berry extract 500 mg Cap Take by mouth.    aspirin (ECOTRIN) 81 MG EC tablet Take 81 mg by mouth once daily.    atorvastatin (LIPITOR) 10 MG tablet TAKE 1 TABLET EVERY DAY    codeine-butalbital-ASA-caffeine (BUTALBITAL COMPOUND W/CODEINE) capsule Take by mouth.    coenzyme Q10 (CO Q-10) 100 mg capsule Take 100 mg by mouth once daily.    duloxetine (CYMBALTA) 60 MG capsule Take by mouth.    erythromycin (ROMYCIN) ophthalmic ointment     HYDROcodone-acetaminophen (NORCO)  mg per tablet hydrocodone 10 mg-acetaminophen 325 mg tablet    KRILL OIL ORAL Take by mouth.    levothyroxine (SYNTHROID) 75 MCG tablet TAKE 1 TABLET  BEFORE BREAKFAST AS DIRECTED    LINZESS 72 mcg Cap TK 1 C PO QD    losartan (COZAAR) 25 MG tablet TAKE 1 TABLET AT BEDTIME    mv-min/vit C/glut/lysine/hb124 (IMMUNE SUPPORT ORAL) Take by mouth.    omeprazole-sodium bicarbonate (ZEGERID) 20-1.1 mg-gram Cap Take 60 mg by mouth once daily.     oxybutynin (DITROPAN-XL) 5 MG TR24 Take 1 tablet (5 mg total) by mouth once daily.    tobramycin sulfate 0.3% (TOBREX) 0.3 % ophthalmic solution     topiramate (TOPAMAX) 100 MG tablet Take 100 mg by mouth 2 (two) times daily.     vit A/vit C/vit E/zinc/copper (EYE MULTIVITAMIN ORAL) Take by mouth.     No current facility-administered medications for this visit.       This patient has been assessed for risk factors for clearance of surgery with the following stipulations:    ___ No contraindications  ___ Recommendations for Aspirin, hold x __ days  ___ Cleared for surgery with moderate risks      If you have  any questions regarding the above, please contact my office at [phone].    Sincerely,

## 2021-12-09 NOTE — TELEPHONE ENCOUNTER
----- Message from Srini Clark sent at 12/9/2021  9:46 AM CST -----  Contact: [place]  Type: Needs Medical Advice    Who Called: Jazzmine clifford [facility]   Best Call Back Number: [phone]   Additional Information: Requesting callback regarding needing to get a clearance for eye Sx 2 forms were faxed but no replies   Please Advise-Thank you

## 2022-02-15 ENCOUNTER — HOSPITAL ENCOUNTER (OUTPATIENT)
Dept: RADIOLOGY | Facility: HOSPITAL | Age: 77
Discharge: HOME | End: 2022-02-15
Attending: SURGERY
Payer: MEDICARE

## 2022-02-15 VITALS — HEIGHT: 65 IN | WEIGHT: 145.06 LBS | BODY MASS INDEX: 24.17 KG/M2

## 2022-02-15 DIAGNOSIS — Z12.31 ENCOUNTER FOR SCREENING MAMMOGRAM FOR MALIGNANT NEOPLASM OF BREAST: ICD-10-CM

## 2022-02-15 PROCEDURE — 77067 SCR MAMMO BI INCL CAD: CPT | Mod: TC,PO

## 2022-02-15 PROCEDURE — 77063 BREAST TOMOSYNTHESIS BI: CPT | Mod: TC,PO

## 2022-04-29 ENCOUNTER — TELEPHONE (OUTPATIENT)
Dept: FAMILY MEDICINE | Facility: CLINIC | Age: 77
End: 2022-04-29
Payer: MEDICARE

## 2022-05-19 RX ORDER — ATORVASTATIN CALCIUM 10 MG/1
10 TABLET, FILM COATED ORAL DAILY
Qty: 90 TABLET | Refills: 3 | Status: SHIPPED | OUTPATIENT
Start: 2022-05-19 | End: 2022-11-08 | Stop reason: SDUPTHER

## 2022-05-19 NOTE — TELEPHONE ENCOUNTER
----- Message from Srini Clark sent at 5/19/2022 11:22 AM CDT -----  Contact: pt  Type: Rx Refill Request    Who Called:pt  Refill or New Rx:Refill  Rx Name and Strength:    atorvastatin (LIPITOR) 10 MG tablet    How is the patient currently taking it?(ex.1xday): As Directed  Is this a 30 day or 90 day Rx: As Directed  Preferred Pharmacy with Phone Number:     [facility] - [place], [place] - [address]  [address] [address]  Phone: [phone] Fax:[phone]    Pt call back asking for nurse to confirm     [phone]              other

## 2022-06-27 RX ORDER — LOSARTAN POTASSIUM 25 MG/1
25 TABLET ORAL NIGHTLY
Qty: 90 TABLET | Refills: 3 | Status: SHIPPED | OUTPATIENT
Start: 2022-06-27 | End: 2022-11-08 | Stop reason: SDUPTHER

## 2022-06-27 NOTE — TELEPHONE ENCOUNTER
----- Message from Raghav Funes MA sent at 6/27/2022 12:54 PM CDT -----  Contact: DEJA YATES [[id number]]  Type: Needs Medical Advice    Who Called:DEJA YATES [[id number]]  Best Call Back Number: [phone]  Inquiry/Question: Would you call [facility] pharmacy concerning medication concerns for refill- losartan [phone] Thank you~

## 2022-08-16 ENCOUNTER — TELEPHONE (OUTPATIENT)
Dept: FAMILY MEDICINE | Facility: CLINIC | Age: 77
End: 2022-08-16
Payer: MEDICARE

## 2022-08-16 NOTE — TELEPHONE ENCOUNTER
----- Message from Lexii Lawton sent at 8/16/2022 11:50 AM CDT -----  Contact: [phone]  Type: Needs Medical Advice  Who Called: Pt     Best Call Back Number: [phone]    Additional Information: Pt is calling to get appt rescheduled for a Friday due to car trouble. Next avail is Jan and pt states that is to far out. Pls call back and advise

## 2022-11-08 ENCOUNTER — OFFICE VISIT (OUTPATIENT)
Dept: FAMILY MEDICINE | Facility: CLINIC | Age: 77
End: 2022-11-08
Payer: MEDICARE

## 2022-11-08 VITALS
RESPIRATION RATE: 17 BRPM | HEART RATE: 83 BPM | SYSTOLIC BLOOD PRESSURE: 136 MMHG | WEIGHT: 155.88 LBS | HEIGHT: 65 IN | BODY MASS INDEX: 25.97 KG/M2 | OXYGEN SATURATION: 97 % | TEMPERATURE: 98 F | DIASTOLIC BLOOD PRESSURE: 72 MMHG

## 2022-11-08 DIAGNOSIS — I10 HYPERTENSION, UNSPECIFIED TYPE: Primary | ICD-10-CM

## 2022-11-08 DIAGNOSIS — J98.4 LUNG DENSITY ON X-RAY: ICD-10-CM

## 2022-11-08 DIAGNOSIS — E03.9 HYPOTHYROIDISM, UNSPECIFIED TYPE: ICD-10-CM

## 2022-11-08 DIAGNOSIS — G43.909 MIGRAINE WITHOUT STATUS MIGRAINOSUS, NOT INTRACTABLE, UNSPECIFIED MIGRAINE TYPE: ICD-10-CM

## 2022-11-08 DIAGNOSIS — E78.5 HYPERLIPIDEMIA, UNSPECIFIED HYPERLIPIDEMIA TYPE: ICD-10-CM

## 2022-11-08 DIAGNOSIS — R79.9 ABNORMAL BLOOD FINDING: ICD-10-CM

## 2022-11-08 PROCEDURE — 1101F PT FALLS ASSESS-DOCD LE1/YR: CPT | Mod: CPTII,S$GLB,, | Performed by: FAMILY MEDICINE

## 2022-11-08 PROCEDURE — 99999 PR PBB SHADOW E&M-EST. PATIENT-LVL V: CPT | Mod: PBBFAC,,, | Performed by: FAMILY MEDICINE

## 2022-11-08 PROCEDURE — 90677 PCV20 VACCINE IM: CPT | Mod: S$GLB,,, | Performed by: FAMILY MEDICINE

## 2022-11-08 PROCEDURE — 99214 PR OFFICE/OUTPT VISIT, EST, LEVL IV, 30-39 MIN: ICD-10-PCS | Mod: 25,S$GLB,, | Performed by: FAMILY MEDICINE

## 2022-11-08 PROCEDURE — 3288F FALL RISK ASSESSMENT DOCD: CPT | Mod: CPTII,S$GLB,, | Performed by: FAMILY MEDICINE

## 2022-11-08 PROCEDURE — G0009 PNEUMOCOCCAL CONJUGATE VACCINE 20-VALENT: ICD-10-PCS | Mod: S$GLB,,, | Performed by: FAMILY MEDICINE

## 2022-11-08 PROCEDURE — 3075F SYST BP GE 130 - 139MM HG: CPT | Mod: CPTII,S$GLB,, | Performed by: FAMILY MEDICINE

## 2022-11-08 PROCEDURE — 3288F PR FALLS RISK ASSESSMENT DOCUMENTED: ICD-10-PCS | Mod: CPTII,S$GLB,, | Performed by: FAMILY MEDICINE

## 2022-11-08 PROCEDURE — G0009 ADMIN PNEUMOCOCCAL VACCINE: HCPCS | Mod: S$GLB,,, | Performed by: FAMILY MEDICINE

## 2022-11-08 PROCEDURE — 1126F AMNT PAIN NOTED NONE PRSNT: CPT | Mod: CPTII,S$GLB,, | Performed by: FAMILY MEDICINE

## 2022-11-08 PROCEDURE — 1101F PR PT FALLS ASSESS DOC 0-1 FALLS W/OUT INJ PAST YR: ICD-10-PCS | Mod: CPTII,S$GLB,, | Performed by: FAMILY MEDICINE

## 2022-11-08 PROCEDURE — 1126F PR PAIN SEVERITY QUANTIFIED, NO PAIN PRESENT: ICD-10-PCS | Mod: CPTII,S$GLB,, | Performed by: FAMILY MEDICINE

## 2022-11-08 PROCEDURE — 3078F PR MOST RECENT DIASTOLIC BLOOD PRESSURE < 80 MM HG: ICD-10-PCS | Mod: CPTII,S$GLB,, | Performed by: FAMILY MEDICINE

## 2022-11-08 PROCEDURE — 90694 VACC AIIV4 NO PRSRV 0.5ML IM: CPT | Mod: S$GLB,,, | Performed by: FAMILY MEDICINE

## 2022-11-08 PROCEDURE — 99214 OFFICE O/P EST MOD 30 MIN: CPT | Mod: 25,S$GLB,, | Performed by: FAMILY MEDICINE

## 2022-11-08 PROCEDURE — 1159F MED LIST DOCD IN RCRD: CPT | Mod: CPTII,S$GLB,, | Performed by: FAMILY MEDICINE

## 2022-11-08 PROCEDURE — 3078F DIAST BP <80 MM HG: CPT | Mod: CPTII,S$GLB,, | Performed by: FAMILY MEDICINE

## 2022-11-08 PROCEDURE — 99999 PR PBB SHADOW E&M-EST. PATIENT-LVL V: ICD-10-PCS | Mod: PBBFAC,,, | Performed by: FAMILY MEDICINE

## 2022-11-08 PROCEDURE — G0008 FLU VACCINE - QUADRIVALENT - ADJUVANTED: ICD-10-PCS | Mod: S$GLB,,, | Performed by: FAMILY MEDICINE

## 2022-11-08 PROCEDURE — 3075F PR MOST RECENT SYSTOLIC BLOOD PRESS GE 130-139MM HG: ICD-10-PCS | Mod: CPTII,S$GLB,, | Performed by: FAMILY MEDICINE

## 2022-11-08 PROCEDURE — G0008 ADMIN INFLUENZA VIRUS VAC: HCPCS | Mod: S$GLB,,, | Performed by: FAMILY MEDICINE

## 2022-11-08 PROCEDURE — 1159F PR MEDICATION LIST DOCUMENTED IN MEDICAL RECORD: ICD-10-PCS | Mod: CPTII,S$GLB,, | Performed by: FAMILY MEDICINE

## 2022-11-08 PROCEDURE — 90677 PNEUMOCOCCAL CONJUGATE VACCINE 20-VALENT: ICD-10-PCS | Mod: S$GLB,,, | Performed by: FAMILY MEDICINE

## 2022-11-08 PROCEDURE — 90694 FLU VACCINE - QUADRIVALENT - ADJUVANTED: ICD-10-PCS | Mod: S$GLB,,, | Performed by: FAMILY MEDICINE

## 2022-11-08 RX ORDER — LEVOTHYROXINE SODIUM 75 UG/1
75 TABLET ORAL
Qty: 90 TABLET | Refills: 3 | Status: SHIPPED | OUTPATIENT
Start: 2022-11-08 | End: 2024-01-26

## 2022-11-08 RX ORDER — NEOMYCIN SULFATE, POLYMYXIN B SULFATE, AND DEXAMETHASONE 3.5; 10000; 1 MG/G; [USP'U]/G; MG/G
OINTMENT OPHTHALMIC
COMMUNITY
Start: 2022-06-21

## 2022-11-08 RX ORDER — ATORVASTATIN CALCIUM 10 MG/1
10 TABLET, FILM COATED ORAL DAILY
Qty: 90 TABLET | Refills: 3 | Status: SHIPPED | OUTPATIENT
Start: 2022-11-08 | End: 2023-07-21

## 2022-11-08 RX ORDER — LOSARTAN POTASSIUM 25 MG/1
25 TABLET ORAL NIGHTLY
Qty: 90 TABLET | Refills: 3 | Status: SHIPPED | OUTPATIENT
Start: 2022-11-08 | End: 2023-05-26 | Stop reason: SDUPTHER

## 2022-11-08 NOTE — PROGRESS NOTES
Patient verified by name and . Patient received HD flu & [id number] vaccine in left Deltoid per patient request. Patient tolerated injection well. Patient advised to wait in clinic for 15 minutes in case of adverse reactions. Patient demonstrated understanding.

## 2022-11-08 NOTE — PROGRESS NOTES
Subjective:   Patient ID: Gala Gaines is a 76 y.o. female     Chief Complaint:Annual Exam      Here for checkup    Review of Systems   Constitutional:  Negative for chills and fever.   HENT:  Negative for sore throat and trouble swallowing.    Respiratory:  Negative for cough and shortness of breath.    Cardiovascular:  Negative for chest pain and leg swelling.   Gastrointestinal:  Negative for abdominal distention and abdominal pain.   Genitourinary:  Negative for dysuria and flank pain.   Musculoskeletal:  Negative for arthralgias and back pain.   Skin:  Negative for color change and pallor.   Neurological:  Negative for weakness and headaches.   Psychiatric/Behavioral:  Negative for agitation and confusion.    Past Medical History:   Diagnosis Date    Coronary artery disease     Migraine headache     Thyroid disease      Past Surgical History:   Procedure Laterality Date    APPENDECTOMY       SECTION      CHOLECYSTECTOMY      HYSTERECTOMY      SHOULDER ARTHROSCOPY      right    TONSILLECTOMY       Objective:     Vitals:    22 1116   BP: 136/72   Pulse: 83   Resp: 17   Temp: 97.9 °F (36.6 °C)     Body mass index is 25.94 kg/m².  Physical Exam  Vitals and nursing note reviewed.   Constitutional:       Appearance: She is well-developed.   HENT:      Head: Normocephalic and atraumatic.   Eyes:      General: No scleral icterus.     Conjunctiva/sclera: Conjunctivae normal.   Cardiovascular:      Heart sounds: No murmur heard.  Pulmonary:      Effort: Pulmonary effort is normal. No respiratory distress.   Musculoskeletal:         General: No deformity. Normal range of motion.      Cervical back: Normal range of motion and neck supple.   Skin:     Coloration: Skin is not pale.      Findings: No rash.   Neurological:      Mental Status: She is alert and oriented to person, place, and time.   Psychiatric:         Behavior: Behavior normal.         Thought Content: Thought content normal.          Judgment: Judgment normal.     Assessment:     1. Hypertension, unspecified type    2. Hyperlipidemia, unspecified hyperlipidemia type    3. Abnormal blood finding    4. Hypothyroidism, unspecified type    5. Lung density on x-ray    6. Migraine without status migrainosus, not intractable, unspecified migraine type      Plan:   Hypertension, unspecified type  -     Comprehensive Metabolic Panel; Future; Expected date: 11/08/2022  -     Hemoglobin A1C; Future; Expected date: 11/08/2022  -     Lipid Panel; Future; Expected date: 11/08/2022  -     TSH; Future; Expected date: 11/08/2022  -     CBC Auto Differential; Future; Expected date: 11/08/2022  -     FERRITIN; Future; Expected date: 11/08/2022  -     losartan (COZAAR) 25 MG tablet; Take 1 tablet (25 mg total) by mouth nightly.  Dispense: 90 tablet; Refill: 3    Hyperlipidemia, unspecified hyperlipidemia type  -     Comprehensive Metabolic Panel; Future; Expected date: 11/08/2022  -     Hemoglobin A1C; Future; Expected date: 11/08/2022  -     Lipid Panel; Future; Expected date: 11/08/2022  -     TSH; Future; Expected date: 11/08/2022  -     CBC Auto Differential; Future; Expected date: 11/08/2022  -     FERRITIN; Future; Expected date: 11/08/2022  -     atorvastatin (LIPITOR) 10 MG tablet; Take 1 tablet (10 mg total) by mouth once daily.  Dispense: 90 tablet; Refill: 3    Abnormal blood finding  -     Hemoglobin A1C; Future; Expected date: 11/08/2022  -     FERRITIN; Future; Expected date: 11/08/2022  -     VITAMIN B12; Future; Expected date: 11/08/2022    Hypothyroidism, unspecified type  -     levothyroxine (SYNTHROID) 75 MCG tablet; Take 1 tablet (75 mcg total) by mouth before breakfast.  Dispense: 90 tablet; Refill: 3    Lung density on x-ray  -     X-Ray Chest PA And Lateral; Future; Expected date: 11/08/2022    Migraine without status migrainosus, not intractable, unspecified migraine type  Seeing dr kimani fenton, reports taking topamax bid and dulexetine  nightly for migraine ppx  Other orders  -     (In Office Administered) Pneumococcal Conjugate Vaccine (20 Valent) (IM)  -     Influenza (FLUAD) - Quadrivalent (Adjuvanted) *Preferred* (65+) (PF)            Total time spent of Greater than 30 minutes minutes on the day of the visit.This includes face to face time and preparing to see the patient, obtaining and reviewing separately obtained history, documenting clinical information in the electronic or other health record, independently interpreting results and communicating results to the patient/family/caregiver, or care coordinator.    Established patient with me has been instructed that must see me at least 1 time yearly (every 365 days) for refills of medications. Seeing other providers in this clinic is fine but expectation is to see me yearly.    Abdirahman Calle MD  11/08/2022    Portions of this note have been dictated with NOELLE Rodriguez

## 2022-11-08 NOTE — PATIENT INSTRUCTIONS
Toñito Cedeño,     If you are due for any health screening(s) below please notify me so we can arrange them to be ordered and scheduled to maintain your health. Most healthy patients complete it. Don't lose out on improving your health.     Tests to Keep You Healthy    Last Blood Pressure <= 139/89 (11/8/2022): NO

## 2022-11-09 ENCOUNTER — TELEPHONE (OUTPATIENT)
Dept: FAMILY MEDICINE | Facility: CLINIC | Age: 77
End: 2022-11-09
Payer: MEDICARE

## 2022-11-09 ENCOUNTER — HOSPITAL ENCOUNTER (OUTPATIENT)
Dept: RADIOLOGY | Facility: CLINIC | Age: 77
Discharge: HOME | End: 2022-11-09
Attending: FAMILY MEDICINE
Payer: MEDICARE

## 2022-11-09 DIAGNOSIS — J98.4 LUNG DENSITY ON X-RAY: ICD-10-CM

## 2022-11-09 PROCEDURE — 71046 XR CHEST PA AND LATERAL: ICD-10-PCS | Mod: 26,,, | Performed by: RADIOLOGY

## 2022-11-09 PROCEDURE — 71046 X-RAY EXAM CHEST 2 VIEWS: CPT | Mod: TC,FY,PO

## 2022-11-09 PROCEDURE — 71046 X-RAY EXAM CHEST 2 VIEWS: CPT | Mod: 26,,, | Performed by: RADIOLOGY

## 2022-11-09 NOTE — TELEPHONE ENCOUNTER
----- Message from Luz Chacon sent at 11/9/2022  8:20 AM CST -----  Regarding: URINE  Good Morning,  Pt did urine sample and dropped it off to the lab but we need order put in so we can schedule and check it in please.    Thank you,  Luz

## 2022-11-23 ENCOUNTER — TELEPHONE (OUTPATIENT)
Dept: CARDIOLOGY | Facility: CLINIC | Age: 77
End: 2022-11-23
Payer: MEDICARE

## 2022-11-23 NOTE — TELEPHONE ENCOUNTER
----- Message from Maddie Tavares sent at 11/23/2022 12:31 PM CST -----  Contact: called at [phone]  Type:  Sooner Appointment Request    Caller is requesting a sooner appointment.  Caller declined first available appointment listed below.  Caller will not accept being placed on the waitlist and is requesting a message be sent to doctor.    Name of Caller:  Pt  When is the first available appointment?  N/A  Best Call Back Number:  [phone]  Additional Information:  Pt is calling to get a sooner appt, none were available. Please call back and advice.

## 2022-11-23 NOTE — TELEPHONE ENCOUNTER
Patient was advised that we do not have any openings right now for Dr. Zamora for Dec 2022. Pt understood that Jan schedule has not been released yet but we would add to list to call with appt once its opened.

## 2022-11-30 ENCOUNTER — E-VISIT (OUTPATIENT)
Dept: FAMILY MEDICINE | Facility: CLINIC | Age: 77
End: 2022-11-30
Payer: MEDICARE

## 2022-11-30 ENCOUNTER — TELEPHONE (OUTPATIENT)
Dept: FAMILY MEDICINE | Facility: CLINIC | Age: 77
End: 2022-11-30
Payer: MEDICARE

## 2022-11-30 DIAGNOSIS — R30.0 DYSURIA: Primary | ICD-10-CM

## 2022-11-30 PROCEDURE — 99421 OL DIG E/M SVC 5-10 MIN: CPT | Mod: S$GLB,,, | Performed by: FAMILY MEDICINE

## 2022-11-30 PROCEDURE — 99421 PR E&M, ONLINE DIGIT, EST, < 7 DAYS, 5-10 MINS: ICD-10-PCS | Mod: S$GLB,,, | Performed by: FAMILY MEDICINE

## 2022-11-30 NOTE — TELEPHONE ENCOUNTER
Called and spoke to patient, advised she will need an appointment to be sent in an antibiotic. I sent patient an e-Visit to complete for UTI. Patient states she is not sure if she knows how to do so, portal shows last login 11/9, so she is active. Advised patient if she can not get it to work, to give me a call back and I will try to get her seen by someone if we have any same day cancellations. If no available appointments, will advise urgent care.   Patient verbalized understanding to all.

## 2022-11-30 NOTE — TELEPHONE ENCOUNTER
----- Message from Jen Zaheer sent at 11/30/2022 10:33 AM CST -----  Contact: Patient  Type:  Patient Call          Who Called: Patient         Does the patient know what this is regarding?: Requesting a callback ; Pt said she have a possible UTI infection and she's in pain when she urinate ; please advise           Would the patient rather a call back or a response via [facility]? Call           Best Call Back Number: [phone]             Additional Information:   [id number] [place] - [address] AT [facility] [address]  [address]  [place] [address]  Phone: [phone] Fax: [phone]

## 2022-11-30 NOTE — PROGRESS NOTES
Patient ID: Gala Gaines is a 77 y.o. female.    Chief Complaint: dysuria  The patient initiated a request through [facility] on 11/30/2022 for evaluation and management with a chief complaint of No chief complaint on file.     I evaluated the questionnaire submission on 11/30/2022.    [facility] Peq Evisit Uti Questionnaire    11/30/2022 10:57 AM CST - Filed by Patient   Do you agree to participate in an E-Visit? Yes   If you have any of the following problems, please present to your local ER or call 911:  I acknowledge   What is the main issue that you would like for your doctor to address today? Burning urination   Are you able to take your vital signs? No   What symptoms do you currently have? Pain while passing urine   When did your symptoms first appear? 11/29/2022   List what you have done or taken to help your symptoms. Drinking plenty of water   Please indicate whether you have had the following symptoms during the past 24 hours     Urgent urination (a sudden and uncontrollable urge to urinate) Severe   Frequent urination of small amounts of urine (going to the toilet very often) Severe   Burning pain when urinating Severe   Incomplete bladder emptying (still feel like you need to urinate again after urination) Severe   Pain not associated with urination in the lower abdomen below the belly button) None   What does your urine look like? I am not sure   Blood seen in the urine (without menstrual cycle) None   Flank pain (pain in one or both sides of the lower back) None   Abnormal Vaginal Discharge (abnormal amount, color and/or odor) None   Discharge from the urethra (urinary opening) without urination None   High body temperature/fever? None-<99.5   Please rate how much discomfort you have experience because of the symptoms in the past 24 hours: Moderate   Please indicate how the symptoms have interfered with your every day activities/work in the past 24 hours: Moderate   Please indicate how these symptoms have  interfered with your social activities (visiting people, meeting with friends, etc.) in the past 24 hours? Moderate   Are you a diabetic? No   If you are female, please indicate whether you have the following at the time of completion of this questionnaire: Signs of menopause syndrome (hot flashes)   Is there a chance you could be pregnant? No   Provide any information you feel is important to your history not asked above    Please attach any relevant images or files (if you have performed a home test for UTI, please submit a photo of results)           Active Problem List with Overview Notes    Diagnosis Date Noted    Gastroesophageal reflux disease without esophagitis 04/27/2021    Migraine without status migrainosus, not intractable 04/27/2021    Constipation 04/27/2021    Atherosclerosis of abdominal aorta 11/17/2019     HCC code noted on Abd US      Hypertension 09/30/2019    Fatigue 06/01/2012    Hyperlipidemia 06/01/2012    Hypothyroid 06/01/2012    Annual physical exam 06/01/2012      Recent Labs Obtained:  No visits with results within 7 Day(s) from this visit.   Latest known visit with results is:   Lab Visit on 11/09/2022   Component Date Value Ref Range Status    Sodium 11/09/2022 141  136 - 145 mmol/L Final    Potassium 11/09/2022 3.8  3.5 - 5.1 mmol/L Final    Chloride 11/09/2022 110  95 - 110 mmol/L Final    CO2 11/09/2022 23  23 - 29 mmol/L Final    Glucose 11/09/2022 86  70 - 110 mg/dL Final    BUN 11/09/2022 20  8 - 23 mg/dL Final    Creatinine 11/09/2022 0.7  0.5 - 1.4 mg/dL Final    Calcium 11/09/2022 9.2  8.7 - 10.5 mg/dL Final    Total Protein 11/09/2022 6.9  6.0 - 8.4 g/dL Final    Albumin 11/09/2022 4.1  3.5 - 5.2 g/dL Final    Total Bilirubin 11/09/2022 0.3  0.1 - 1.0 mg/dL Final    Comment: For infants and newborns, interpretation of results should be based  on gestational age, weight and in agreement with clinical  observations.    Premature Infant recommended reference ranges:  Up to 24  hours.............<8.0 mg/dL  Up to 48 hours............<12.0 mg/dL  3-5 days..................<15.0 mg/dL  6-29 days.................<15.0 mg/dL      Alkaline Phosphatase 11/09/2022 70  55 - 135 U/L Final    AST 11/09/2022 16  10 - 40 U/L Final    ALT 11/09/2022 13  10 - 44 U/L Final    Anion Gap 11/09/2022 8  8 - 16 mmol/L Final    eGFR 11/09/2022 >60.0  >60 mL/min/1.73 m^2 Final    Hemoglobin A1C 11/09/2022 5.3  4.0 - 5.6 % Final    Comment: ADA Screening Guidelines:  5.7-6.4%  Consistent with prediabetes  >or=6.5%  Consistent with diabetes    High levels of fetal hemoglobin interfere with the HbA1C  assay. Heterozygous hemoglobin variants (HbS, HgC, etc)do  not significantly interfere with this assay.   However, presence of multiple variants may affect accuracy.      Estimated Avg Glucose 11/09/2022 105  68 - 131 mg/dL Final    Cholesterol 11/09/2022 200 (H)  120 - 199 mg/dL Final    Comment: The National Cholesterol Education Program (NCEP) has set the  following guidelines (reference ranges) for Cholesterol:  Optimal.....................<200 mg/dL  Borderline High.............200-239 mg/dL  High........................> or = 240 mg/dL      Triglycerides 11/09/2022 65  30 - 150 mg/dL Final    Comment: The National Cholesterol Education Program (NCEP) has set the  following guidelines (reference values) for triglycerides:  Normal......................<150 mg/dL  Borderline High.............150-199 mg/dL  High........................200-499 mg/dL      HDL 11/09/2022 66  40 - 75 mg/dL Final    Comment: The National Cholesterol Education Program (NCEP) has set the  following guidelines (reference values) for HDL Cholesterol:  Low...............<40 mg/dL  Optimal...........>60 mg/dL      LDL Cholesterol 11/09/2022 121.0  63.0 - 159.0 mg/dL Final    Comment: The National Cholesterol Education Program (NCEP) has set the  following guidelines (reference values) for LDL Cholesterol:  Optimal.......................<130  mg/dL  Borderline High...............130-159 mg/dL  High..........................160-189 mg/dL  Very High.....................>190 mg/dL      HDL/Cholesterol Ratio 11/09/2022 33.0  20.0 - 50.0 % Final    Total Cholesterol/HDL Ratio 11/09/2022 3.0  2.0 - 5.0 Final    Non-HDL Cholesterol 11/09/2022 134  mg/dL Final    Comment: Risk category and Non-HDL cholesterol goals:  Coronary heart disease (CHD)or equivalent (10-year risk of CHD >20%):  Non-HDL cholesterol goal     <130 mg/dL  Two or more CHD risk factors and 10-year risk of CHD <= 20%:  Non-HDL cholesterol goal     <160 mg/dL  0 to 1 CHD risk factor:  Non-HDL cholesterol goal     <190 mg/dL      TSH 11/09/2022 1.461  0.400 - 4.000 uIU/mL Final    WBC 11/09/2022 8.42  3.90 - 12.70 K/uL Final    RBC 11/09/2022 3.74 (L)  4.00 - 5.40 M/uL Final    Hemoglobin 11/09/2022 11.6 (L)  12.0 - 16.0 g/dL Final    Hematocrit 11/09/2022 37.4  37.0 - 48.5 % Final    MCV 11/09/2022 100 (H)  82 - 98 fL Final    MCH 11/09/2022 31.0  27.0 - 31.0 pg Final    MCHC 11/09/2022 31.0 (L)  32.0 - 36.0 g/dL Final    RDW 11/09/2022 15.0 (H)  11.5 - 14.5 % Final    Platelets 11/09/2022 298  150 - 450 K/uL Final    MPV 11/09/2022 10.6  9.2 - 12.9 fL Final    Immature Granulocytes 11/09/2022 0.1  0.0 - 0.5 % Final    Gran # (ANC) 11/09/2022 5.9  1.8 - 7.7 K/uL Final    Immature Grans (Abs) 11/09/2022 0.01  0.00 - 0.04 K/uL Final    Comment: Mild elevation in immature granulocytes is non specific and   can be seen in a variety of conditions including stress response,   acute inflammation, trauma and pregnancy. Correlation with other   laboratory and clinical findings is essential.      Lymph # 11/09/2022 1.7  1.0 - 4.8 K/uL Final    Mono # 11/09/2022 0.6  0.3 - 1.0 K/uL Final    Eos # 11/09/2022 0.2  0.0 - 0.5 K/uL Final    Baso # 11/09/2022 0.05  0.00 - 0.20 K/uL Final    nRBC 11/09/2022 0  0 /100 WBC Final    Gran % 11/09/2022 70.1  38.0 - 73.0 % Final    Lymph % 11/09/2022 20.1  18.0 - 48.0  % Final    Mono % 2022 7.1  4.0 - 15.0 % Final    Eosinophil % 2022 2.0  0.0 - 8.0 % Final    Basophil % 2022 0.6  0.0 - 1.9 % Final    Differential Method 2022 Automated   Final    Ferritin 2022 48  20.0 - 300.0 ng/mL Final    Vitamin B-12 2022 1890 (H)  210 - 950 pg/mL Final       Encounter Diagnosis   Name Primary?    Dysuria Yes        Orders Placed This Encounter   Procedures    Urinalysis, Reflex to Urine Culture Urine, Clean Catch     Order Specific Question:   Preferred Collection Type     Answer:   Urine, Clean Catch     Order Specific Question:   Specimen Source     Answer:   Urine            E-Visit Time Trackin-10 mins

## 2022-12-01 ENCOUNTER — LAB VISIT (OUTPATIENT)
Dept: LAB | Facility: HOSPITAL | Age: 77
End: 2022-12-01
Attending: FAMILY MEDICINE
Payer: MEDICARE

## 2022-12-01 DIAGNOSIS — R30.0 DYSURIA: ICD-10-CM

## 2022-12-01 LAB
BACTERIA #/AREA URNS AUTO: ABNORMAL /HPF
BILIRUB UR QL STRIP: NEGATIVE
CLARITY UR REFRACT.AUTO: ABNORMAL
COLOR UR AUTO: YELLOW
GLUCOSE UR QL STRIP: NEGATIVE
HGB UR QL STRIP: ABNORMAL
KETONES UR QL STRIP: NEGATIVE
LEUKOCYTE ESTERASE UR QL STRIP: ABNORMAL
MICROSCOPIC COMMENT: ABNORMAL
NITRITE UR QL STRIP: POSITIVE
PH UR STRIP: 6 [PH] (ref 5–8)
PROT UR QL STRIP: NEGATIVE
RBC #/AREA URNS AUTO: 4 /HPF (ref 0–4)
SP GR UR STRIP: 1.01 (ref 1–1.03)
SQUAMOUS #/AREA URNS AUTO: 1 /HPF
URN SPEC COLLECT METH UR: ABNORMAL
WBC #/AREA URNS AUTO: 83 /HPF (ref 0–5)

## 2022-12-01 PROCEDURE — 87088 URINE BACTERIA CULTURE: CPT | Performed by: FAMILY MEDICINE

## 2022-12-01 PROCEDURE — 81001 URINALYSIS AUTO W/SCOPE: CPT | Performed by: FAMILY MEDICINE

## 2022-12-01 PROCEDURE — 87077 CULTURE AEROBIC IDENTIFY: CPT | Performed by: FAMILY MEDICINE

## 2022-12-01 PROCEDURE — 87086 URINE CULTURE/COLONY COUNT: CPT | Performed by: FAMILY MEDICINE

## 2022-12-01 PROCEDURE — 87186 SC STD MICRODIL/AGAR DIL: CPT | Performed by: FAMILY MEDICINE

## 2022-12-02 ENCOUNTER — TELEPHONE (OUTPATIENT)
Dept: FAMILY MEDICINE | Facility: CLINIC | Age: 77
End: 2022-12-02
Payer: MEDICARE

## 2022-12-02 DIAGNOSIS — N39.0 URINARY TRACT INFECTION WITHOUT HEMATURIA, SITE UNSPECIFIED: Primary | ICD-10-CM

## 2022-12-02 RX ORDER — NITROFURANTOIN 25; 75 MG/1; MG/1
100 CAPSULE ORAL 2 TIMES DAILY
Qty: 10 CAPSULE | Refills: 0 | Status: SHIPPED | OUTPATIENT
Start: 2022-12-02 | End: 2022-12-07

## 2022-12-04 LAB — BACTERIA UR CULT: ABNORMAL

## 2022-12-12 ENCOUNTER — TELEPHONE (OUTPATIENT)
Dept: FAMILY MEDICINE | Facility: CLINIC | Age: 77
End: 2022-12-12
Payer: MEDICARE

## 2022-12-12 NOTE — TELEPHONE ENCOUNTER
Spoke to pt states she completed an e-visit on 11/30/22. Pt states she completed the 5 days of antibiotics and started feeling slightly better. Pt states she now feels like it is coming back. Pt states she is experiencing burning and irritation. Pt is wanting to know if an additional script can be called in prior to scheduling another appt. Pt did say she is drinking plenty fluids and has tried AZO with no relief. Please advise

## 2022-12-12 NOTE — TELEPHONE ENCOUNTER
----- Message from Corby Robert sent at 12/12/2022 11:29 AM CST -----  Contact: pt at [phone]  Type: Needs Medical Advice  Who Called:  pt  Best Call Back Number: [phone]  Additional Information: pt is calling the office to see if she can get her Nitrofurantoin mono/mac 100 mg capsules for a UTI and she thinks it never went away

## 2022-12-13 ENCOUNTER — E-VISIT (OUTPATIENT)
Dept: FAMILY MEDICINE | Facility: CLINIC | Age: 77
End: 2022-12-13
Payer: MEDICARE

## 2022-12-13 DIAGNOSIS — R30.0 DYSURIA: Primary | ICD-10-CM

## 2022-12-13 PROCEDURE — 99421 PR E&M, ONLINE DIGIT, EST, < 7 DAYS, 5-10 MINS: ICD-10-PCS | Mod: S$GLB,,, | Performed by: FAMILY MEDICINE

## 2022-12-13 PROCEDURE — 99421 OL DIG E/M SVC 5-10 MIN: CPT | Mod: S$GLB,,, | Performed by: FAMILY MEDICINE

## 2022-12-13 NOTE — TELEPHONE ENCOUNTER
Spoke to patient, advised E-Visit sent to be completed, patient verbalized understanding and will complete. Advised patient would be back in touch with further instructions after completion.

## 2022-12-15 ENCOUNTER — TELEPHONE (OUTPATIENT)
Dept: FAMILY MEDICINE | Facility: CLINIC | Age: 77
End: 2022-12-15
Payer: MEDICARE

## 2022-12-15 DIAGNOSIS — N39.0 URINARY TRACT INFECTION WITHOUT HEMATURIA, SITE UNSPECIFIED: ICD-10-CM

## 2022-12-15 DIAGNOSIS — R30.0 DYSURIA: Primary | ICD-10-CM

## 2022-12-15 NOTE — PROGRESS NOTES
Patient ID: Gala Gaines is a 77 y.o. female.    Chief Complaint: Dysuria after UTI treatment  The patient initiated a request through [employer] on 12/13/2022 for evaluation and management with a chief complaint of No chief complaint on file.     I evaluated the questionnaire submission on 12/13/22.    [facility] Peq Evisit Uti Questionnaire    12/13/2022 10:33 AM CST - Filed by Patient   Do you agree to participate in an E-Visit? Yes   If you have any of the following problems, please present to your local ER or call 911:  I acknowledge   What is the main issue that you would like for your doctor to address today? Pain when urinating   Are you able to take your vital signs? No   What symptoms do you currently have? Pain while passing urine   When did your symptoms first appear? 12/3/2022   List what you have done or taken to help your symptoms. Finished antibiotic   Please indicate whether you have had the following symptoms during the past 24 hours     Urgent urination (a sudden and uncontrollable urge to urinate) Mild   Frequent urination of small amounts of urine (going to the toilet very often) Mild   Burning pain when urinating Moderate   Incomplete bladder emptying (still feel like you need to urinate again after urination) Mild   Pain not associated with urination in the lower abdomen below the belly button) None   What does your urine look like? Cloudy   Blood seen in the urine (without menstrual cycle) None   Flank pain (pain in one or both sides of the lower back) None   Abnormal Vaginal Discharge (abnormal amount, color and/or odor) None   Discharge from the urethra (urinary opening) without urination None   High body temperature/fever? None-<99.5   Please rate how much discomfort you have experience because of the symptoms in the past 24 hours: Moderate   Please indicate how the symptoms have interfered with your every day activities/work in the past 24 hours: Mild   Please indicate how these symptoms have  interfered with your social activities (visiting people, meeting with friends, etc.) in the past 24 hours? Mild   Are you a diabetic? No   If you are female, please indicate whether you have the following at the time of completion of this questionnaire: Signs of menopause syndrome (hot flashes)   Are you currently pregnant, could you be pregnant, or are you breast feeding? None of the above   Provide any information you feel is important to your history not asked above    Please attach any relevant images or files (if you have performed a home test for UTI, please submit a photo of results)           Active Problem List with Overview Notes    Diagnosis Date Noted    Gastroesophageal reflux disease without esophagitis 04/27/2021    Migraine without status migrainosus, not intractable 04/27/2021    Constipation 04/27/2021    Atherosclerosis of abdominal aorta 11/17/2019     HCC code noted on Abd US      Hypertension 09/30/2019    Fatigue 06/01/2012    Hyperlipidemia 06/01/2012    Hypothyroid 06/01/2012    Annual physical exam 06/01/2012      Recent Labs Obtained:  No visits with results within 7 Day(s) from this visit.   Latest known visit with results is:   Lab Visit on 12/01/2022   Component Date Value Ref Range Status    Specimen UA 12/01/2022 Urine, Unspecified   Final    Color, UA 12/01/2022 Yellow  Yellow, Straw, Cheryle Final    Appearance, UA 12/01/2022 Hazy (A)  Clear Final    pH, UA 12/01/2022 6.0  5.0 - 8.0 Final    Specific Gravity, UA 12/01/2022 1.010  1.005 - 1.030 Final    Protein, UA 12/01/2022 Negative  Negative Final    Comment: Recommend a 24 hour urine protein or a urine   protein/creatinine ratio if globulin induced proteinuria is  clinically suspected.      Glucose, UA 12/01/2022 Negative  Negative Final    Ketones, UA 12/01/2022 Negative  Negative Final    Bilirubin (UA) 12/01/2022 Negative  Negative Final    Occult Blood UA 12/01/2022 2+ (A)  Negative Final    Nitrite, UA 12/01/2022 Positive (A)   Negative Final    Leukocytes, UA 2022 3+ (A)  Negative Final    RBC, UA 2022 4  0 - 4 /hpf Final    WBC, UA 2022 83 (H)  0 - 5 /hpf Final    Bacteria 2022 Many (A)  None-Occ /hpf Final    Squam Epithel, UA 2022 1  /hpf Final    Microscopic Comment 2022 SEE COMMENT   Final    Comment: Other formed elements not mentioned in the report are not   present in the microscopic examination.       Urine Culture, Routine 2022  (A)   Final                    Value:ESCHERICHIA COLI  >100,000 cfu/ml         Encounter Diagnosis   Name Primary?    Dysuria Yes        Orders Placed This Encounter   Procedures    Urine culture     Standing Status:   Future     Number of Occurrences:   1     Standing Expiration Date:   2024    Urinalysis     Standing Status:   Future     Number of Occurrences:   1     Standing Expiration Date:   2024     Order Specific Question:   Collection Type     Answer:   Urine, Clean Catch            E-Visit Time Trackin-10 mins

## 2022-12-15 NOTE — TELEPHONE ENCOUNTER
Pt was scheduled to have a urine on 12/13/22 order shows still pending.    Spoke to Yared in Lab whom advised pt dropped urine off in a different container and not able to run urine. Pt will need to come  a   specimen cup and complete a new urine specimen.    Spoke to pt whom is very furious. Pt states she dropped off a specimen on Tuesday and specifically asked the  if the cup used was appropriate.  advised pt the cup pt used was ok. Pt states why now on Thursday it took her to call for results to be told that her container was not appropriate and will need to recollect. Pt states here is is Thursday she has already completed one round of antibiotics which did not clear pt's symptoms. Pt is very uncomfortable and burning. Pt states it is extremely uncomfortable to urinate. She states her son was an MD prior to passing away and would have never treated her pt's this way. Pt states she currently has another son in the hospital dying and she is having to deal with this nonsense. Pt states she has a dead battery in her car and will not make it to the clinic prior to 5pm. Offered pt to go to hospital and pt declined. Pt states she will come to the clinic tomorrow without an appt, piss in the correct cup on her own time and her urine will be ran ASAP. Pt states she will also speak to Dr. Calle and have a good grandmother's talk to him. Pt will not allow me to get one word in. I tried explaining to pt if she goes to hospital her urine will be ran quicker. Pt is not happy and is in tears stating she is furious and should have been told when scheduled that she needs to  a specimen cup and even informed when she dropped off her urine that it was in the wrong cup. Pt states this should not have been done over 2 days later.  Will need new orders. Please advise further

## 2022-12-15 NOTE — TELEPHONE ENCOUNTER
----- Message from Bert Moon sent at 12/15/2022  3:23 PM CST -----  Contact: pt at [phone]  Type: Needs Medical Advice  Who Called:  Pt   Best Call Back Number: [phone]    Additional Information: pt called in to see why she have not heard from anyone about her test results please call back to advise

## 2022-12-16 ENCOUNTER — LAB VISIT (OUTPATIENT)
Dept: LAB | Facility: HOSPITAL | Age: 77
End: 2022-12-16
Attending: FAMILY MEDICINE
Payer: MEDICARE

## 2022-12-16 DIAGNOSIS — N39.0 URINARY TRACT INFECTION WITHOUT HEMATURIA, SITE UNSPECIFIED: ICD-10-CM

## 2022-12-16 PROCEDURE — 81001 URINALYSIS AUTO W/SCOPE: CPT | Mod: HCNC | Performed by: FAMILY MEDICINE

## 2022-12-16 PROCEDURE — 87186 SC STD MICRODIL/AGAR DIL: CPT | Mod: HCNC | Performed by: FAMILY MEDICINE

## 2022-12-16 PROCEDURE — 87088 URINE BACTERIA CULTURE: CPT | Mod: HCNC | Performed by: FAMILY MEDICINE

## 2022-12-16 PROCEDURE — 87086 URINE CULTURE/COLONY COUNT: CPT | Mod: HCNC | Performed by: FAMILY MEDICINE

## 2022-12-16 PROCEDURE — 87077 CULTURE AEROBIC IDENTIFY: CPT | Mod: HCNC | Performed by: FAMILY MEDICINE

## 2022-12-16 RX ORDER — NITROFURANTOIN 25; 75 MG/1; MG/1
100 CAPSULE ORAL 2 TIMES DAILY
Qty: 6 CAPSULE | Refills: 0 | Status: SHIPPED | OUTPATIENT
Start: 2022-12-16 | End: 2022-12-19

## 2022-12-16 NOTE — TELEPHONE ENCOUNTER
Spoke to patient regarding needing urine sample. Patient states she is on her way to ED to see her son. When she is able, patient requested by this nurse to come to Dr. Calle's office.... not the lab. Patient is to ask for this nurse & will take care of specimen collection. Reassured patient & apologized to which patient seemed calm; cooperative with plan as stated.

## 2022-12-16 NOTE — TELEPHONE ENCOUNTER
Patient must provide urine sample prior to treatment. I have called in a short course antibiotics but she must not start them until she has provided a urine sample.

## 2022-12-17 LAB
BACTERIA #/AREA URNS AUTO: ABNORMAL /HPF
BILIRUB UR QL STRIP: NEGATIVE
CLARITY UR REFRACT.AUTO: ABNORMAL
COLOR UR AUTO: YELLOW
GLUCOSE UR QL STRIP: NEGATIVE
HGB UR QL STRIP: NEGATIVE
HYALINE CASTS UR QL AUTO: 2 /LPF
KETONES UR QL STRIP: NEGATIVE
LEUKOCYTE ESTERASE UR QL STRIP: ABNORMAL
MICROSCOPIC COMMENT: ABNORMAL
NITRITE UR QL STRIP: POSITIVE
PH UR STRIP: 6 [PH] (ref 5–8)
PROT UR QL STRIP: ABNORMAL
RBC #/AREA URNS AUTO: 3 /HPF (ref 0–4)
SP GR UR STRIP: 1.02 (ref 1–1.03)
SQUAMOUS #/AREA URNS AUTO: 0 /HPF
URN SPEC COLLECT METH UR: ABNORMAL
WBC #/AREA URNS AUTO: >100 /HPF (ref 0–5)

## 2022-12-18 LAB — BACTERIA UR CULT: ABNORMAL

## 2022-12-19 ENCOUNTER — TELEPHONE (OUTPATIENT)
Dept: FAMILY MEDICINE | Facility: CLINIC | Age: 77
End: 2022-12-19
Payer: MEDICARE

## 2022-12-19 DIAGNOSIS — N39.0 RECURRENT UTI: Primary | ICD-10-CM

## 2022-12-19 RX ORDER — AMOXICILLIN AND CLAVULANATE POTASSIUM 875; 125 MG/1; MG/1
1 TABLET, FILM COATED ORAL 2 TIMES DAILY
Qty: 20 TABLET | Refills: 0 | Status: SHIPPED | OUTPATIENT
Start: 2022-12-19 | End: 2022-12-29

## 2022-12-19 NOTE — TELEPHONE ENCOUNTER
----- Message from Etelvina Rafal sent at 12/19/2022 10:46 AM CST -----  Regarding: needs refill  Type: Needs Medical Advice  Who Called:  Gala Chatterjee Call Back Number: [phone]    Additional Information: Pt was given medication nitrofurantoin mono/mac to last her for weekend regarding uti. Pat does in fact have infection and needs more medication sent in     [id number] - [place] - [address] AT [facility] [address]  [facility] [place] [address]  Phone: [phone] Fax: [phone]

## 2023-01-09 ENCOUNTER — TELEPHONE (OUTPATIENT)
Dept: CARDIOLOGY | Facility: CLINIC | Age: 78
End: 2023-01-09
Payer: MEDICARE

## 2023-01-09 NOTE — TELEPHONE ENCOUNTER
----- Message from Jay Moraes sent at 1/9/2023 10:06 AM CST -----  Regarding: rescheduling appt  Contact: Patient  Patient want to speak with a nurse regarding rescheduling appointment, please call back at [phone] (home)

## 2023-02-07 DIAGNOSIS — Z00.00 ENCOUNTER FOR MEDICARE ANNUAL WELLNESS EXAM: ICD-10-CM

## 2023-02-09 DIAGNOSIS — Z00.00 ENCOUNTER FOR MEDICARE ANNUAL WELLNESS EXAM: ICD-10-CM

## 2023-03-08 ENCOUNTER — PES CALL (OUTPATIENT)
Dept: ADMINISTRATIVE | Facility: CLINIC | Age: 78
End: 2023-03-08
Payer: MEDICARE

## 2023-04-10 ENCOUNTER — OFFICE VISIT (OUTPATIENT)
Dept: CARDIOLOGY | Facility: CLINIC | Age: 78
End: 2023-04-10
Payer: MEDICARE

## 2023-04-10 VITALS
HEIGHT: 65 IN | DIASTOLIC BLOOD PRESSURE: 74 MMHG | WEIGHT: 152 LBS | SYSTOLIC BLOOD PRESSURE: 122 MMHG | BODY MASS INDEX: 25.33 KG/M2 | HEART RATE: 79 BPM | RESPIRATION RATE: 16 BRPM | OXYGEN SATURATION: 98 %

## 2023-04-10 DIAGNOSIS — K21.9 GASTROESOPHAGEAL REFLUX DISEASE WITHOUT ESOPHAGITIS: ICD-10-CM

## 2023-04-10 DIAGNOSIS — I70.0 ATHEROSCLEROSIS OF ABDOMINAL AORTA: ICD-10-CM

## 2023-04-10 DIAGNOSIS — R07.89 OTHER CHEST PAIN: Primary | ICD-10-CM

## 2023-04-10 DIAGNOSIS — E78.00 PURE HYPERCHOLESTEROLEMIA: ICD-10-CM

## 2023-04-10 PROCEDURE — 99499 RISK ADDL DX/OHS AUDIT: ICD-10-PCS | Mod: HCNC,S$GLB,, | Performed by: INTERNAL MEDICINE

## 2023-04-10 PROCEDURE — 1159F PR MEDICATION LIST DOCUMENTED IN MEDICAL RECORD: ICD-10-PCS | Mod: HCNC,CPTII,S$GLB, | Performed by: INTERNAL MEDICINE

## 2023-04-10 PROCEDURE — 3288F PR FALLS RISK ASSESSMENT DOCUMENTED: ICD-10-PCS | Mod: HCNC,CPTII,S$GLB, | Performed by: INTERNAL MEDICINE

## 2023-04-10 PROCEDURE — 1126F PR PAIN SEVERITY QUANTIFIED, NO PAIN PRESENT: ICD-10-PCS | Mod: HCNC,CPTII,S$GLB, | Performed by: INTERNAL MEDICINE

## 2023-04-10 PROCEDURE — 99999 PR PBB SHADOW E&M-EST. PATIENT-LVL IV: CPT | Mod: PBBFAC,HCNC,, | Performed by: INTERNAL MEDICINE

## 2023-04-10 PROCEDURE — 1126F AMNT PAIN NOTED NONE PRSNT: CPT | Mod: HCNC,CPTII,S$GLB, | Performed by: INTERNAL MEDICINE

## 2023-04-10 PROCEDURE — 3074F SYST BP LT 130 MM HG: CPT | Mod: HCNC,CPTII,S$GLB, | Performed by: INTERNAL MEDICINE

## 2023-04-10 PROCEDURE — 93000 ELECTROCARDIOGRAM COMPLETE: CPT | Mod: HCNC,S$GLB,, | Performed by: INTERNAL MEDICINE

## 2023-04-10 PROCEDURE — 1160F RVW MEDS BY RX/DR IN RCRD: CPT | Mod: HCNC,CPTII,S$GLB, | Performed by: INTERNAL MEDICINE

## 2023-04-10 PROCEDURE — 3288F FALL RISK ASSESSMENT DOCD: CPT | Mod: HCNC,CPTII,S$GLB, | Performed by: INTERNAL MEDICINE

## 2023-04-10 PROCEDURE — 99499 UNLISTED E&M SERVICE: CPT | Mod: HCNC,S$GLB,, | Performed by: INTERNAL MEDICINE

## 2023-04-10 PROCEDURE — 1101F PT FALLS ASSESS-DOCD LE1/YR: CPT | Mod: HCNC,CPTII,S$GLB, | Performed by: INTERNAL MEDICINE

## 2023-04-10 PROCEDURE — 93000 EKG 12-LEAD: ICD-10-PCS | Mod: HCNC,S$GLB,, | Performed by: INTERNAL MEDICINE

## 2023-04-10 PROCEDURE — 99214 PR OFFICE/OUTPT VISIT, EST, LEVL IV, 30-39 MIN: ICD-10-PCS | Mod: HCNC,S$GLB,, | Performed by: INTERNAL MEDICINE

## 2023-04-10 PROCEDURE — 1159F MED LIST DOCD IN RCRD: CPT | Mod: HCNC,CPTII,S$GLB, | Performed by: INTERNAL MEDICINE

## 2023-04-10 PROCEDURE — 3078F PR MOST RECENT DIASTOLIC BLOOD PRESSURE < 80 MM HG: ICD-10-PCS | Mod: HCNC,CPTII,S$GLB, | Performed by: INTERNAL MEDICINE

## 2023-04-10 PROCEDURE — 1101F PR PT FALLS ASSESS DOC 0-1 FALLS W/OUT INJ PAST YR: ICD-10-PCS | Mod: HCNC,CPTII,S$GLB, | Performed by: INTERNAL MEDICINE

## 2023-04-10 PROCEDURE — 99999 PR PBB SHADOW E&M-EST. PATIENT-LVL IV: ICD-10-PCS | Mod: PBBFAC,HCNC,, | Performed by: INTERNAL MEDICINE

## 2023-04-10 PROCEDURE — 3078F DIAST BP <80 MM HG: CPT | Mod: HCNC,CPTII,S$GLB, | Performed by: INTERNAL MEDICINE

## 2023-04-10 PROCEDURE — 1160F PR REVIEW ALL MEDS BY PRESCRIBER/CLIN PHARMACIST DOCUMENTED: ICD-10-PCS | Mod: HCNC,CPTII,S$GLB, | Performed by: INTERNAL MEDICINE

## 2023-04-10 PROCEDURE — 3074F PR MOST RECENT SYSTOLIC BLOOD PRESSURE < 130 MM HG: ICD-10-PCS | Mod: HCNC,CPTII,S$GLB, | Performed by: INTERNAL MEDICINE

## 2023-04-10 PROCEDURE — 99214 OFFICE O/P EST MOD 30 MIN: CPT | Mod: HCNC,S$GLB,, | Performed by: INTERNAL MEDICINE

## 2023-04-10 NOTE — ASSESSMENT & PLAN NOTE
She has other symptoms of recurrent chest pressure varying intensity noted.  Arm will obtain a symptom limited exercise stress test with nuclear imaging to evaluate for any evidence of coronary insufficiency.

## 2023-04-10 NOTE — PROGRESS NOTES
Subjective:    Patient ID:  Gala Gaines is a 77 y.o. female patient here for evaluation Follow-up and Muscle Pain (She has stopped her atorvastatin, she has been having the joint pain and muscle pain)      History of Present Illness:   Patient is a 77-year-old with history of dyslipidemia had been having intermittent episodes of discomfort in his chest with pressure-like sensation.  And she is seeking follow-up evaluation.    She has been increased amount of stress with family related issues and illness of her son.  She is no blood in the stools no black stools no nausea vomiting noted effort capacity has been currently climbing.    Progressive fatigue is noted in addition she has developed arthralgias and pains in both hips in other joints and she stop the Lipitor and symptoms have improved.  She had tried Crestor previously and also Zocor with very similar symptoms and manifestations of arthralgias and myalgias.          Review of patient's allergies indicates:   Allergen Reactions    Levofloxacin      Other reaction(s): fainting spells  Other reaction(s): fainting spells    Nuts  [tree nut]        Past Medical History:   Diagnosis Date    Coronary artery disease     Migraine headache     Thyroid disease      Past Surgical History:   Procedure Laterality Date    APPENDECTOMY       SECTION      CHOLECYSTECTOMY      HYSTERECTOMY      SHOULDER ARTHROSCOPY      right    TONSILLECTOMY       Social History     Tobacco Use    Smoking status: Never    Smokeless tobacco: Never   Substance Use Topics    Alcohol use: Yes     Comment: occasional    Drug use: No        Review of Systems:    As noted in HPI in addition      REVIEW OF SYSTEMS  CARDIOVASCULAR: No recent chest pain, palpitations, arm, neck, or jaw pain  RESPIRATORY: No recent fever, cough chills, SOB or congestion  : No blood in the urine  GI: No Nausea, vomiting, constipation, diarrhea, blood, or reflux.  MUSCULOSKELETAL:  Positive for severe  myalgias and arthralgias involving both hips as well as knees.  Limiting her activity associated with statin therapies and also the right shoulder.    NEURO: No lightheadedness or dizziness  EYES: No Double vision, blurry, vision or headache              Objective        Vitals:    04/10/23 0958   BP: 122/74   Pulse: 79   Resp: 16       LIPIDS - LAST 2   Lab Results   Component Value Date    CHOL 200 (H) 11/09/2022    CHOL 175 08/23/2019    HDL 66 11/09/2022    HDL 62 08/23/2019    LDLCALC 121.0 11/09/2022    LDLCALC 99.0 08/23/2019    TRIG 65 11/09/2022    TRIG 70 08/23/2019    CHOLHDL 33.0 11/09/2022    CHOLHDL 35.4 08/23/2019       CBC - LAST 2  Lab Results   Component Value Date    WBC 8.42 11/09/2022    WBC 8.22 09/29/2019    RBC 3.74 (L) 11/09/2022    RBC 3.55 (L) 09/29/2019    HGB 11.6 (L) 11/09/2022    HGB 11.1 (L) 09/29/2019    HCT 37.4 11/09/2022    HCT 34.7 (L) 09/29/2019     (H) 11/09/2022    MCV 98 09/29/2019    MCH 31.0 11/09/2022    MCH 31.3 (H) 09/29/2019    MCHC 31.0 (L) 11/09/2022    MCHC 32.0 09/29/2019    RDW 15.0 (H) 11/09/2022    RDW 13.8 09/29/2019     11/09/2022     09/29/2019    MPV 10.6 11/09/2022    MPV 10.9 09/29/2019    GRAN 5.9 11/09/2022    GRAN 70.1 11/09/2022    LYMPH 1.7 11/09/2022    LYMPH 20.1 11/09/2022    MONO 0.6 11/09/2022    MONO 7.1 11/09/2022    BASO 0.05 11/09/2022    BASO 0.03 09/29/2019    NRBC 0 11/09/2022    NRBC 0 09/29/2019       CHEMISTRY & LIVER FUNCTION - LAST 2  Lab Results   Component Value Date     11/09/2022     09/29/2019    K 3.8 11/09/2022    K 4.0 09/29/2019     11/09/2022     09/29/2019    CO2 23 11/09/2022    CO2 25 09/29/2019    ANIONGAP 8 11/09/2022    ANIONGAP 9 09/29/2019    BUN 20 11/09/2022    BUN 21 09/29/2019    CREATININE 0.7 11/09/2022    CREATININE 0.6 09/29/2019    GLU 86 11/09/2022    GLU 94 09/29/2019    CALCIUM 9.2 11/09/2022    CALCIUM 9.1 09/29/2019    MG 1.9 09/29/2019    ALBUMIN 4.1  11/09/2022    ALBUMIN 4.0 09/29/2019    PROT 6.9 11/09/2022    PROT 6.6 09/29/2019    ALKPHOS 70 11/09/2022    ALKPHOS 56 09/29/2019    ALT 13 11/09/2022    ALT 17 09/29/2019    AST 16 11/09/2022    AST 17 09/29/2019    BILITOT 0.3 11/09/2022    BILITOT 0.5 09/29/2019        CARDIAC PROFILE - LAST 2  Lab Results   Component Value Date    TROPONINI <0.030 09/30/2019    TROPONINI <0.030 09/30/2019        COAGULATION - LAST 2  Lab Results   Component Value Date    LABPT 13.1 09/29/2019    INR 1.0 09/29/2019       ENDOCRINE & PSA - LAST 2  Lab Results   Component Value Date    HGBA1C 5.3 11/09/2022    HGBA1C 5.3 08/23/2019    TSH 1.461 11/09/2022    TSH 0.756 08/23/2019        ECHOCARDIOGRAM RESULTS  No results found for this or any previous visit.      CURRENT/PREVIOUS VISIT EKG  Results for orders placed or performed during the hospital encounter of 09/29/19   EKG 12-LEAD    Collection Time: 09/29/19 11:14 PM    Narrative    Test Reason : R07.9,    Vent. Rate : 073 BPM     Atrial Rate : 073 BPM     P-R Int : 164 ms          QRS Dur : 084 ms      QT Int : 404 ms       P-R-T Axes : 074 023 055 degrees     QTc Int : 445 ms    Normal sinus rhythm  Normal ECG  No previous ECGs available  Confirmed by Girish Zamora MD ([id number]) on 10/1/2019 8:01:49 PM    Referred By: VIOLET   SELF           Confirmed By:Girish Zamora MD     No valid procedures specified.   Results for orders placed during the hospital encounter of 09/29/19    Treadmill Stress Test    Interpretation Summary    ECG Stress Nuclear portion of this study will be reported separately.    The EKG portion of this study is negative for ischemia.    The patient reported no chest pain during the stress test.  04/10/2023 EKG shows normal sinus rhythm within normal limits.    PHYSICAL EXAM  CONSTITUTIONAL: Well built, well nourished in no apparent distress  NECK: no carotid bruit, no JVD  LUNGS: CTA  CHEST WALL: no tenderness  HEART: regular rate and rhythm, S1, S2  normal, no murmur, click, rub or gallop   ABDOMEN: soft, non-tender; bowel sounds normal; no masses,  no organomegaly  EXTREMITIES: Extremities normal, no edema, no calf tenderness noted  NEURO: AAO X 3    I HAVE REVIEWED :    The vital signs, nurses notes, and all the pertinent radiology and labs.        Current Outpatient Medications   Medication Instructions    acai berry extract 500 mg Cap Oral    aspirin (ECOTRIN) 81 mg, Oral, Daily    atorvastatin (LIPITOR) 10 mg, Oral, Daily    calcium carbonate (CALCIUM 300 ORAL) Oral    codeine-butalbital-ASA-caffeine (BUTALBITAL COMPOUND W/CODEINE) 23--40 mg Cap Take by mouth.    coenzyme Q10 100 mg, Oral, Daily    DULoxetine (CYMBALTA) 60 MG capsule Take by mouth.    erythromycin (ROMYCIN) ophthalmic ointment No dose, route, or frequency recorded.    HYDROcodone-acetaminophen (NORCO)  mg per tablet hydrocodone 10 mg-acetaminophen 325 mg tablet    KRILL OIL ORAL Oral    levothyroxine (SYNTHROID) 75 mcg, Oral, Before breakfast    losartan (COZAAR) 25 mg, Oral, Nightly    mv-min/vit C/glut/lysine/hb124 (IMMUNE SUPPORT ORAL) Oral    neomycin-polymyxin-dexamethasone (DEXACINE) 3.5 mg/g-10,000 unit/g-0.1 % Oint No dose, route, or frequency recorded.    omeprazole-sodium bicarbonate 20-1.1 mg-gram Cap 60 mg, Oral, Daily    oxybutynin (DITROPAN-XL) 5 mg, Oral, Daily    tobramycin sulfate 0.3% (TOBREX) 0.3 % ophthalmic solution No dose, route, or frequency recorded.    topiramate (TOPAMAX) 100 mg, Oral, 2 times daily    vit A/vit C/vit E/zinc/copper (EYE MULTIVITAMIN ORAL) Oral          Assessment & Plan     Hyperlipidemia  Patient has history of significant dyslipidemia   Latest Reference Range & Units Most Recent   Cholesterol 120 - 199 mg/dL 200 (H)  11/9/22 08:20   Cholesterol, Total  193 (E)  4/16/18 00:00   HDL 40 - 75 mg/dL 66  11/9/22 08:20   HDL/Cholesterol Ratio 20.0 - 50.0 % 33.0  11/9/22 08:20   LDL Cholesterol External 63.0 - 159.0 mg/dL 121.0  11/9/22  08:20   Non-HDL Cholesterol mg/dL 134  11/9/22 08:20   Total Cholesterol/HDL Ratio 2.0 - 5.0  3.0  11/9/22 08:20   TRIGLYCERIDE (LIPID PAN)  144 (E)  4/16/18 00:00   Triglycerides 30 - 150 mg/dL 65  11/9/22 08:20   (H): Data is abnormally high  (E): External lab result  On low-dose of Lipitor her LDL cholesterol still remains elevated.  She needs aggressive risk factor modification because of associated coronary artery disease and strong family history.    Recommend to do the following  1. Stop Lipitor  2. She has tried Crestor and Zocor in the past with severe myalgias.    3. Try NEXLEZET FOR SYMPTOM RELIEF AND SEE HOW SHE DOES WITH THIS.  In the meanwhile maintain on low-fat low-cholesterol diet    Atherosclerosis of abdominal aorta  She has a 2 associated ASCVD needs aggressive lipid modification.    Gastroesophageal reflux disease without esophagitis  Continue on omeprazole 60 mg daily    Other chest pain  She has other symptoms of recurrent chest pressure varying intensity noted.  Arm will obtain a symptom limited exercise stress test with nuclear imaging to evaluate for any evidence of coronary insufficiency.          Follow up in about 6 weeks (around 5/22/2023).

## 2023-04-10 NOTE — ASSESSMENT & PLAN NOTE
Patient has history of significant dyslipidemia   Latest Reference Range & Units Most Recent   Cholesterol 120 - 199 mg/dL 200 (H)  11/9/22 08:20   Cholesterol, Total  193 (E)  4/16/18 00:00   HDL 40 - 75 mg/dL 66  11/9/22 08:20   HDL/Cholesterol Ratio 20.0 - 50.0 % 33.0  11/9/22 08:20   LDL Cholesterol External 63.0 - 159.0 mg/dL 121.0  11/9/22 08:20   Non-HDL Cholesterol mg/dL 134  11/9/22 08:20   Total Cholesterol/HDL Ratio 2.0 - 5.0  3.0  11/9/22 08:20   TRIGLYCERIDE (LIPID PAN)  144 (E)  4/16/18 00:00   Triglycerides 30 - 150 mg/dL 65  11/9/22 08:20   (H): Data is abnormally high  (E): External lab result  On low-dose of Lipitor her LDL cholesterol still remains elevated.  She needs aggressive risk factor modification because of associated coronary artery disease and strong family history.    Recommend to do the following  1. Stop Lipitor  2. She has tried Crestor and Zocor in the past with severe myalgias.    3. Try NEXLEZET FOR SYMPTOM RELIEF AND SEE HOW SHE DOES WITH THIS.  In the meanwhile maintain on low-fat low-cholesterol diet

## 2023-04-18 ENCOUNTER — TELEPHONE (OUTPATIENT)
Dept: CARDIOLOGY | Facility: HOSPITAL | Age: 78
End: 2023-04-18

## 2023-04-18 NOTE — TELEPHONE ENCOUNTER
Patient advised, test will be at [facility] ([address]   Will need to register on the first floor at the main entrance.   Patient advised that arrival time is 6:50am.  Patient advised that she may be here about 3.5-4 hours, and may want to bring something to occupy their time, as there will be periods of waiting.    Patient advised, may take her medications prior to testing if you need to.  Patient should HOLD Fioricet. Advised if she needs to eat to take her medications, please keep it light, like toast and juice.    Patient advised to avoid all caffeine 12 hours prior to testing.  This includes decaf tea and coffee.    Will provide peanut butter crackers for a snack after stress test.  If patient would prefer something else, please bring a snack from home.    Wear comfortable clothing.   No lotions, oils, or powders to the upper chest area. May wear deodorant.    No metal jewelry, buttons, or zippers to the upper body.  Patient verbalizes understanding of instructions.

## 2023-04-19 ENCOUNTER — HOSPITAL ENCOUNTER (OUTPATIENT)
Dept: CARDIOLOGY | Facility: HOSPITAL | Age: 78
Discharge: HOME | End: 2023-04-19
Attending: INTERNAL MEDICINE
Payer: MEDICARE

## 2023-04-19 ENCOUNTER — HOSPITAL ENCOUNTER (OUTPATIENT)
Dept: RADIOLOGY | Facility: HOSPITAL | Age: 78
Discharge: HOME | End: 2023-04-19
Attending: INTERNAL MEDICINE
Payer: MEDICARE

## 2023-04-19 DIAGNOSIS — E78.00 PURE HYPERCHOLESTEROLEMIA: ICD-10-CM

## 2023-04-19 DIAGNOSIS — R07.89 OTHER CHEST PAIN: ICD-10-CM

## 2023-04-19 DIAGNOSIS — I70.0 ATHEROSCLEROSIS OF ABDOMINAL AORTA: ICD-10-CM

## 2023-04-19 LAB
CV PHARM DOSE: 0.4 MG
CV STRESS BASE HR: 66 BPM
DIASTOLIC BLOOD PRESSURE: 82 MMHG
EJECTION FRACTION- HIGH: 65 %
END DIASTOLIC INDEX-HIGH: 153 ML/M2
END DIASTOLIC INDEX-LOW: 93 ML/M2
END SYSTOLIC INDEX-HIGH: 71 ML/M2
END SYSTOLIC INDEX-LOW: 31 ML/M2
NUC REST DIASTOLIC VOLUME INDEX: 65
NUC REST EJECTION FRACTION: 68
NUC REST SYSTOLIC VOLUME INDEX: 21
NUC STRESS DIASTOLIC VOLUME INDEX: 61
NUC STRESS EJECTION FRACTION: 70 %
NUC STRESS SYSTOLIC VOLUME INDEX: 18
OHS CV CPX 1 MINUTE RECOVERY HEART RATE: 86 BPM
OHS CV CPX 85 PERCENT MAX PREDICTED HEART RATE MALE: 116
OHS CV CPX MAX PREDICTED HEART RATE: 136
OHS CV CPX PATIENT IS FEMALE: 1
OHS CV CPX PATIENT IS MALE: 0
OHS CV CPX PEAK DIASTOLIC BLOOD PRESSURE: 70 MMHG
OHS CV CPX PEAK HEAR RATE: 86 BPM
OHS CV CPX PEAK RATE PRESSURE PRODUCT: NORMAL
OHS CV CPX PEAK SYSTOLIC BLOOD PRESSURE: 128 MMHG
OHS CV CPX PERCENT MAX PREDICTED HEART RATE ACHIEVED: 63
OHS CV CPX RATE PRESSURE PRODUCT PRESENTING: 8580
RETIRED EF AND QEF - SEE NOTES: 53 %
SYSTOLIC BLOOD PRESSURE: 130 MMHG

## 2023-04-19 PROCEDURE — 93018 CV STRESS TEST I&R ONLY: CPT | Mod: ,,, | Performed by: INTERNAL MEDICINE

## 2023-04-19 PROCEDURE — A9502 TC99M TETROFOSMIN: HCPCS

## 2023-04-19 PROCEDURE — 78452 NUCLEAR STRESS - CARDIOLOGY INTERPRETED (CUPID ONLY): ICD-10-PCS | Mod: 26,,, | Performed by: INTERNAL MEDICINE

## 2023-04-19 PROCEDURE — 93018 NUCLEAR STRESS - CARDIOLOGY INTERPRETED (CUPID ONLY): ICD-10-PCS | Mod: ,,, | Performed by: INTERNAL MEDICINE

## 2023-04-19 PROCEDURE — 78452 HT MUSCLE IMAGE SPECT MULT: CPT | Mod: 26,,, | Performed by: INTERNAL MEDICINE

## 2023-04-19 PROCEDURE — 78452 HT MUSCLE IMAGE SPECT MULT: CPT

## 2023-04-19 PROCEDURE — 93016 CV STRESS TEST SUPVJ ONLY: CPT | Mod: ,,,

## 2023-04-19 PROCEDURE — 93016 NUCLEAR STRESS - CARDIOLOGY INTERPRETED (CUPID ONLY): ICD-10-PCS | Mod: ,,,

## 2023-04-19 RX ORDER — REGADENOSON 0.08 MG/ML
0.4 INJECTION, SOLUTION INTRAVENOUS ONCE
Status: COMPLETED | OUTPATIENT
Start: 2023-04-19 | End: 2023-04-19

## 2023-04-19 RX ADMIN — REGADENOSON 0.4 MG: 0.08 INJECTION, SOLUTION INTRAVENOUS at 08:04

## 2023-04-21 ENCOUNTER — HOSPITAL ENCOUNTER (OUTPATIENT)
Dept: RADIOLOGY | Facility: HOSPITAL | Age: 78
Discharge: HOME | End: 2023-04-21
Attending: NURSE PRACTITIONER
Payer: MEDICARE

## 2023-04-21 VITALS — HEIGHT: 65 IN | BODY MASS INDEX: 25.3 KG/M2 | WEIGHT: 151.88 LBS

## 2023-04-21 DIAGNOSIS — Z12.31 VISIT FOR SCREENING MAMMOGRAM: ICD-10-CM

## 2023-04-21 PROCEDURE — 77067 SCR MAMMO BI INCL CAD: CPT | Mod: TC,PO

## 2023-05-09 ENCOUNTER — TELEPHONE (OUTPATIENT)
Dept: FAMILY MEDICINE | Facility: CLINIC | Age: 78
End: 2023-05-09
Payer: MEDICARE

## 2023-05-09 ENCOUNTER — TELEPHONE (OUTPATIENT)
Dept: CARDIOLOGY | Facility: CLINIC | Age: 78
End: 2023-05-09
Payer: MEDICARE

## 2023-05-09 NOTE — TELEPHONE ENCOUNTER
----- Message from Edmundo Mendoza sent at 5/9/2023  3:08 PM CDT -----  Regarding: shayy  Contact: denita at [phone]  Type: Needs Medical Advice    Who Called:  denita    Best Call Back Number: [phone]    Additional Information: pt states was switched to bempedoic acid-ezetimibe 180-10 mg Tab b/c of pain of previous med. This med is 3x worse and is more expensive. PT discontinued taking this med. Please call to discuss. B/c pt is not taking any statin med at this time.

## 2023-05-09 NOTE — TELEPHONE ENCOUNTER
Spoke to patient, states she could not make her appointment tomorrow and was wondering if she needed labs done, assisted with rescheduling appointment, patient is going to wait until appointment to get labs ordered.

## 2023-05-09 NOTE — TELEPHONE ENCOUNTER
----- Message from Erica Roa sent at 5/9/2023  1:12 PM CDT -----  Regarding: appointment  Contact: patient  Type:  Sooner Appointment Request    Caller is requesting a sooner appointment.  Caller declined first available appointment listed below.  Caller will not accept being placed on the waitlist and is requesting a message be sent to doctor.    Name of Caller:  patient  When is the first available appointment?    Symptoms:  6 month follow up  Best Call Back Number:  [phone] (home)     Additional Information:  Please call patient to schedule.  Patient would like to know when you would like her blood work done.  Patient states she has gotten off of her statin, cardiologist put her on a new one and couldn't take that one either.  States statins are too painful.  Please call patient to advise.  Thanks!

## 2023-05-10 ENCOUNTER — PES CALL (OUTPATIENT)
Dept: ADMINISTRATIVE | Facility: CLINIC | Age: 78
End: 2023-05-10
Payer: MEDICARE

## 2023-05-22 ENCOUNTER — TELEPHONE (OUTPATIENT)
Dept: FAMILY MEDICINE | Facility: CLINIC | Age: 78
End: 2023-05-22
Payer: MEDICARE

## 2023-05-22 NOTE — TELEPHONE ENCOUNTER
----- Message from Janet Sanchez sent at 5/22/2023 11:12 AM CDT -----  Contact: self  Type:  Needs Medical Advice    Who Called: Pt  Symptoms (please be specific): Covid   How long has patient had these symptoms:  1 day (4 ppl in the home all have symptoms)  Pharmacy name and phone #:    [id number] - [place] - [address] [facility] [address] [facility] [address]  [facility] [place] [address]  Phone: [phone] Fax: [phone]  Would the patient rather a call back or a response via [facility]?  call  Best Call Back Number: [phone]    1. Would like to know if Dr. Calle can send Rx into the pharmacy?   2. Pt would like to know if Dr. Calle prescribe some duexis 800-26.6mg for the stiffness in her joints.   3. Pt had to cancel her 5/23 appt due to Covid and would like to be rescheduled for next month, Dr. MOSQUEDA didn't have anything available...     Please call to advise... Thank you...

## 2023-05-22 NOTE — TELEPHONE ENCOUNTER
Left message for patient to return call to clinic    Sent e-visit via portal as well for covid symptoms    Last 2 May appts have been cancelled 1 by provider template change and 1 by patient. LOV 11/8/22

## 2023-05-22 NOTE — TELEPHONE ENCOUNTER
----- Message from Luis Angel Verde sent at 5/22/2023  2:17 PM CDT -----  Type:  Patient Returning Call    Who Called:  Patient  Who Left Message for Patient:  Erika  Does the patient know what this is regarding?:    Best Call Back Number:  [phone]  Additional Information:

## 2023-05-22 NOTE — TELEPHONE ENCOUNTER
Spoke to pt whom states she tested positive for COVID today 5/22/23  Pt complains of weakness, fatigue, body aches, headaches, non productive cough and no fever. Pt denies and SOB or trouble breathing. Pt speaks in full sentences without difficulty.   Offered pt E-visit and pt declined stating she does not do portal her daughter does and they all have COVID as well. Pt states the rest of the family went to Dr and did not get any prescriptions sent to pharmacy. Pt declined VV also.   Pt states she needs to reschedule follow up appt. Scheduled pt f/u appt with [facility]ACH[facility]. Pt also requesting a new medication duexis 800-26.6mg for the stiffness in her joints. I advised pt she will need to be seen and evaluated for a new medication. Pt advised she tried her daughters and it worked for her.   Reviewed at home Covid  treatment with pt and she is still wanting to know if a prescription for COVID can be sent to pharmacy. Please advise

## 2023-05-26 DIAGNOSIS — I10 HYPERTENSION, UNSPECIFIED TYPE: ICD-10-CM

## 2023-05-26 RX ORDER — LOSARTAN POTASSIUM 25 MG/1
25 TABLET ORAL NIGHTLY
Qty: 90 TABLET | Refills: 3 | Status: SHIPPED | OUTPATIENT
Start: 2023-05-26 | End: 2023-06-02 | Stop reason: SDUPTHER

## 2023-05-26 NOTE — TELEPHONE ENCOUNTER
----- Message from Juan Covington sent at 5/26/2023  9:00 AM CDT -----  Type: Needs Medical Advice  Who Called:  pt  Symptoms (please be specific):  pt said she need to speak to the NP about her losartan (COZAAR) 25 MG tablet--said she canceled her med with [facility] and she need her med--please call and advise  Pharmacy name and phone #:      [facility] - [place], [place] - [address]  [address] [address]  Phone: [phone] Fax: [phone]      Best Call Back Number: [phone] (home)     Additional Information: thank you

## 2023-06-02 DIAGNOSIS — I10 HYPERTENSION, UNSPECIFIED TYPE: ICD-10-CM

## 2023-06-02 RX ORDER — LOSARTAN POTASSIUM 25 MG/1
25 TABLET ORAL NIGHTLY
Qty: 90 TABLET | Refills: 3 | Status: SHIPPED | OUTPATIENT
Start: 2023-06-02 | End: 2023-08-15 | Stop reason: SDUPTHER

## 2023-06-02 NOTE — TELEPHONE ENCOUNTER
----- Message from Phoenix Best sent at 6/2/2023  1:45 PM CDT -----    Type: Need Medical Advice   Who Called: Patient   Best callback number: [phone]  Additional Information: Patient ask for a callback from Rosenda Arguelles about why her blood pressure medication was canceled, patient thinks it was a mistake because she really need her blood pressure medication  Please call to further assist, Thanks

## 2023-07-21 ENCOUNTER — OFFICE VISIT (OUTPATIENT)
Dept: FAMILY MEDICINE | Facility: CLINIC | Age: 78
End: 2023-07-21
Payer: MEDICARE

## 2023-07-21 ENCOUNTER — LAB VISIT (OUTPATIENT)
Dept: LAB | Facility: HOSPITAL | Age: 78
End: 2023-07-21
Attending: FAMILY MEDICINE
Payer: MEDICARE

## 2023-07-21 VITALS
SYSTOLIC BLOOD PRESSURE: 122 MMHG | HEART RATE: 81 BPM | OXYGEN SATURATION: 98 % | WEIGHT: 153 LBS | RESPIRATION RATE: 17 BRPM | HEIGHT: 65 IN | DIASTOLIC BLOOD PRESSURE: 68 MMHG | TEMPERATURE: 98 F | BODY MASS INDEX: 25.49 KG/M2

## 2023-07-21 DIAGNOSIS — E53.8 B12 DEFICIENCY: ICD-10-CM

## 2023-07-21 DIAGNOSIS — E53.8 B12 DEFICIENCY: Primary | ICD-10-CM

## 2023-07-21 DIAGNOSIS — R79.9 ABNORMAL BLOOD FINDING: ICD-10-CM

## 2023-07-21 DIAGNOSIS — R10.9 RIGHT FLANK PAIN: ICD-10-CM

## 2023-07-21 LAB
ALBUMIN SERPL BCP-MCNC: 4 G/DL (ref 3.5–5.2)
ALP SERPL-CCNC: 77 U/L (ref 55–135)
ALT SERPL W/O P-5'-P-CCNC: 9 U/L (ref 10–44)
ANION GAP SERPL CALC-SCNC: 8 MMOL/L (ref 8–16)
AST SERPL-CCNC: 14 U/L (ref 10–40)
BASOPHILS # BLD AUTO: 0.03 K/UL (ref 0–0.2)
BASOPHILS NFR BLD: 0.5 % (ref 0–1.9)
BILIRUB SERPL-MCNC: 0.2 MG/DL (ref 0.1–1)
BUN SERPL-MCNC: 14 MG/DL (ref 8–23)
CALCIUM SERPL-MCNC: 9.5 MG/DL (ref 8.7–10.5)
CHLORIDE SERPL-SCNC: 109 MMOL/L (ref 95–110)
CO2 SERPL-SCNC: 23 MMOL/L (ref 23–29)
CREAT SERPL-MCNC: 0.7 MG/DL (ref 0.5–1.4)
DIFFERENTIAL METHOD: ABNORMAL
EOSINOPHIL # BLD AUTO: 0.1 K/UL (ref 0–0.5)
EOSINOPHIL NFR BLD: 2.2 % (ref 0–8)
ERYTHROCYTE [DISTWIDTH] IN BLOOD BY AUTOMATED COUNT: 14.4 % (ref 11.5–14.5)
EST. GFR  (NO RACE VARIABLE): >60 ML/MIN/1.73 M^2
ESTIMATED AVG GLUCOSE: 103 MG/DL (ref 68–131)
GLUCOSE SERPL-MCNC: 91 MG/DL (ref 70–110)
HBA1C MFR BLD: 5.2 % (ref 4–5.6)
HCT VFR BLD AUTO: 37.7 % (ref 37–48.5)
HGB BLD-MCNC: 11.7 G/DL (ref 12–16)
IMM GRANULOCYTES # BLD AUTO: 0.01 K/UL (ref 0–0.04)
IMM GRANULOCYTES NFR BLD AUTO: 0.2 % (ref 0–0.5)
IRON SERPL-MCNC: 56 UG/DL (ref 30–160)
LYMPHOCYTES # BLD AUTO: 2 K/UL (ref 1–4.8)
LYMPHOCYTES NFR BLD: 32.3 % (ref 18–48)
MCH RBC QN AUTO: 30.7 PG (ref 27–31)
MCHC RBC AUTO-ENTMCNC: 31 G/DL (ref 32–36)
MCV RBC AUTO: 99 FL (ref 82–98)
MONOCYTES # BLD AUTO: 0.5 K/UL (ref 0.3–1)
MONOCYTES NFR BLD: 7.2 % (ref 4–15)
NEUTROPHILS # BLD AUTO: 3.6 K/UL (ref 1.8–7.7)
NEUTROPHILS NFR BLD: 57.6 % (ref 38–73)
NRBC BLD-RTO: 0 /100 WBC
PLATELET # BLD AUTO: 311 K/UL (ref 150–450)
PMV BLD AUTO: 10.2 FL (ref 9.2–12.9)
POTASSIUM SERPL-SCNC: 4.2 MMOL/L (ref 3.5–5.1)
PROT SERPL-MCNC: 7.1 G/DL (ref 6–8.4)
RBC # BLD AUTO: 3.81 M/UL (ref 4–5.4)
SATURATED IRON: 14 % (ref 20–50)
SODIUM SERPL-SCNC: 140 MMOL/L (ref 136–145)
TOTAL IRON BINDING CAPACITY: 413 UG/DL (ref 250–450)
TRANSFERRIN SERPL-MCNC: 279 MG/DL (ref 200–375)
TSH SERPL DL<=0.005 MIU/L-ACNC: 1.56 UIU/ML (ref 0.4–4)
VIT B12 SERPL-MCNC: >2000 PG/ML (ref 210–950)
WBC # BLD AUTO: 6.29 K/UL (ref 3.9–12.7)

## 2023-07-21 PROCEDURE — 3074F PR MOST RECENT SYSTOLIC BLOOD PRESSURE < 130 MM HG: ICD-10-PCS | Mod: HCNC,CPTII,S$GLB, | Performed by: FAMILY MEDICINE

## 2023-07-21 PROCEDURE — 1159F MED LIST DOCD IN RCRD: CPT | Mod: HCNC,CPTII,S$GLB, | Performed by: FAMILY MEDICINE

## 2023-07-21 PROCEDURE — 83036 HEMOGLOBIN GLYCOSYLATED A1C: CPT | Mod: HCNC | Performed by: FAMILY MEDICINE

## 2023-07-21 PROCEDURE — 80053 COMPREHEN METABOLIC PANEL: CPT | Mod: HCNC | Performed by: FAMILY MEDICINE

## 2023-07-21 PROCEDURE — 3074F SYST BP LT 130 MM HG: CPT | Mod: HCNC,CPTII,S$GLB, | Performed by: FAMILY MEDICINE

## 2023-07-21 PROCEDURE — 99999 PR PBB SHADOW E&M-EST. PATIENT-LVL V: ICD-10-PCS | Mod: PBBFAC,HCNC,, | Performed by: FAMILY MEDICINE

## 2023-07-21 PROCEDURE — 3078F DIAST BP <80 MM HG: CPT | Mod: HCNC,CPTII,S$GLB, | Performed by: FAMILY MEDICINE

## 2023-07-21 PROCEDURE — 36415 COLL VENOUS BLD VENIPUNCTURE: CPT | Mod: HCNC,PO | Performed by: FAMILY MEDICINE

## 2023-07-21 PROCEDURE — 3078F PR MOST RECENT DIASTOLIC BLOOD PRESSURE < 80 MM HG: ICD-10-PCS | Mod: HCNC,CPTII,S$GLB, | Performed by: FAMILY MEDICINE

## 2023-07-21 PROCEDURE — 1101F PR PT FALLS ASSESS DOC 0-1 FALLS W/OUT INJ PAST YR: ICD-10-PCS | Mod: HCNC,CPTII,S$GLB, | Performed by: FAMILY MEDICINE

## 2023-07-21 PROCEDURE — 84443 ASSAY THYROID STIM HORMONE: CPT | Mod: HCNC | Performed by: FAMILY MEDICINE

## 2023-07-21 PROCEDURE — 1101F PT FALLS ASSESS-DOCD LE1/YR: CPT | Mod: HCNC,CPTII,S$GLB, | Performed by: FAMILY MEDICINE

## 2023-07-21 PROCEDURE — 3288F FALL RISK ASSESSMENT DOCD: CPT | Mod: HCNC,CPTII,S$GLB, | Performed by: FAMILY MEDICINE

## 2023-07-21 PROCEDURE — 1159F PR MEDICATION LIST DOCUMENTED IN MEDICAL RECORD: ICD-10-PCS | Mod: HCNC,CPTII,S$GLB, | Performed by: FAMILY MEDICINE

## 2023-07-21 PROCEDURE — 1125F PR PAIN SEVERITY QUANTIFIED, PAIN PRESENT: ICD-10-PCS | Mod: HCNC,CPTII,S$GLB, | Performed by: FAMILY MEDICINE

## 2023-07-21 PROCEDURE — 99214 PR OFFICE/OUTPT VISIT, EST, LEVL IV, 30-39 MIN: ICD-10-PCS | Mod: HCNC,S$GLB,, | Performed by: FAMILY MEDICINE

## 2023-07-21 PROCEDURE — 99999 PR PBB SHADOW E&M-EST. PATIENT-LVL V: CPT | Mod: PBBFAC,HCNC,, | Performed by: FAMILY MEDICINE

## 2023-07-21 PROCEDURE — 85025 COMPLETE CBC W/AUTO DIFF WBC: CPT | Mod: HCNC | Performed by: FAMILY MEDICINE

## 2023-07-21 PROCEDURE — 3288F PR FALLS RISK ASSESSMENT DOCUMENTED: ICD-10-PCS | Mod: HCNC,CPTII,S$GLB, | Performed by: FAMILY MEDICINE

## 2023-07-21 PROCEDURE — 82607 VITAMIN B-12: CPT | Mod: HCNC | Performed by: FAMILY MEDICINE

## 2023-07-21 PROCEDURE — 1125F AMNT PAIN NOTED PAIN PRSNT: CPT | Mod: HCNC,CPTII,S$GLB, | Performed by: FAMILY MEDICINE

## 2023-07-21 PROCEDURE — 99214 OFFICE O/P EST MOD 30 MIN: CPT | Mod: HCNC,S$GLB,, | Performed by: FAMILY MEDICINE

## 2023-07-21 PROCEDURE — 84466 ASSAY OF TRANSFERRIN: CPT | Mod: HCNC | Performed by: FAMILY MEDICINE

## 2023-07-21 RX ORDER — CYANOCOBALAMIN 1000 UG/ML
1000 INJECTION, SOLUTION INTRAMUSCULAR; SUBCUTANEOUS ONCE
Qty: 1 ML | Refills: 0 | Status: SHIPPED | OUTPATIENT
Start: 2023-07-21 | End: 2023-07-21

## 2023-07-21 NOTE — PATIENT INSTRUCTIONS
Toñito Cedeño,     If you are due for any health screening(s) below please notify me so we can arrange them to be ordered and scheduled to maintain your health. Most healthy patients complete it. Don't lose out on improving your health.     All of your core healthy metrics are met.

## 2023-07-21 NOTE — PROGRESS NOTES
Subjective:   Patient ID: Gala Gaines is a 77 y.o. female     Chief Complaint:Fatigue and Flank Pain (Right)      Here for checkup. Notes persistent fatigue for months. Notes having covid 2 months ago but feels fatigue ahs been doing on for longer.    Fatigue  Associated symptoms include fatigue. Pertinent negatives include no abdominal pain or chest pain.   Flank Pain  Pertinent negatives include no abdominal pain, chest pain or dysuria.   Review of Systems   Constitutional:  Positive for fatigue.   Respiratory:  Negative for shortness of breath.    Cardiovascular:  Negative for chest pain.   Gastrointestinal:  Negative for abdominal pain.   Genitourinary:  Positive for flank pain. Negative for dysuria.   Past Medical History:   Diagnosis Date    Coronary artery disease     Migraine headache     Thyroid disease      Past Surgical History:   Procedure Laterality Date    APPENDECTOMY       SECTION      CHOLECYSTECTOMY      HYSTERECTOMY      SHOULDER ARTHROSCOPY      right    TONSILLECTOMY       Objective:     Vitals:    23 0904   BP: 122/68   Pulse: 81   Resp: 17   Temp: 97.8 °F (36.6 °C)     Body mass index is 25.46 kg/m².  Physical Exam  Vitals and nursing note reviewed.   Constitutional:       Appearance: She is well-developed.   HENT:      Head: Normocephalic and atraumatic.   Eyes:      General: No scleral icterus.     Conjunctiva/sclera: Conjunctivae normal.   Cardiovascular:      Heart sounds: No murmur heard.  Pulmonary:      Effort: Pulmonary effort is normal. No respiratory distress.   Musculoskeletal:         General: No deformity. Normal range of motion.      Cervical back: Normal range of motion and neck supple.   Skin:     Coloration: Skin is not pale.      Findings: No rash.   Neurological:      Mental Status: She is alert and oriented to person, place, and time.   Psychiatric:         Behavior: Behavior normal.         Thought Content: Thought content normal.         Judgment:  Judgment normal.   Assessment:     1. B12 deficiency    2. Abnormal blood finding    3. Right flank pain      Plan:   B12 deficiency  -     Comprehensive Metabolic Panel; Future; Expected date: 07/21/2023  -     CBC Auto Differential; Future; Expected date: 07/21/2023  -     Hemoglobin A1C; Future; Expected date: 07/21/2023  -     TSH; Future; Expected date: 07/21/2023  -     VITAMIN B12; Future; Expected date: 07/21/2023  -     cyanocobalamin 1,000 mcg/mL injection; Inject 1 mL (1,000 mcg total) into the muscle once. for 1 dose  Dispense: 1 mL; Refill: 0  -     IRON AND TIBC; Future; Expected date: 07/21/2023    Abnormal blood finding  -     Comprehensive Metabolic Panel; Future; Expected date: 07/21/2023  -     CBC Auto Differential; Future; Expected date: 07/21/2023  -     Hemoglobin A1C; Future; Expected date: 07/21/2023  -     TSH; Future; Expected date: 07/21/2023  -     VITAMIN B12; Future; Expected date: 07/21/2023  -     cyanocobalamin 1,000 mcg/mL injection; Inject 1 mL (1,000 mcg total) into the muscle once. for 1 dose  Dispense: 1 mL; Refill: 0  -     IRON AND TIBC; Future; Expected date: 07/21/2023    Right flank pain  -     US Retroperitoneal Complete; Future; Expected date: 07/21/2023  -     Urinalysis, Reflex to Urine Culture Urine, Clean Catch; Future; Expected date: 07/21/2023          Established patient with me has been instructed that must see me at least 1 time yearly (every 365 days) for refills of medications. Seeing other providers in this clinic is fine but expectation is to see me yearly.    Abdirahman Calle MD  07/21/2023    Portions of this note have been dictated with NOELLE Rodriguez

## 2023-08-07 ENCOUNTER — HOSPITAL ENCOUNTER (OUTPATIENT)
Dept: RADIOLOGY | Facility: HOSPITAL | Age: 78
Discharge: HOME | End: 2023-08-07
Attending: FAMILY MEDICINE
Payer: MEDICARE

## 2023-08-07 DIAGNOSIS — R10.9 RIGHT FLANK PAIN: ICD-10-CM

## 2023-08-07 PROCEDURE — 76770 US EXAM ABDO BACK WALL COMP: CPT | Mod: TC

## 2023-08-09 DIAGNOSIS — N28.1 RENAL CYST: Primary | ICD-10-CM

## 2023-08-10 DIAGNOSIS — Z80.8 FAMILY HISTORY OF THYROID CANCER: Primary | ICD-10-CM

## 2023-08-15 ENCOUNTER — OFFICE VISIT (OUTPATIENT)
Dept: UROLOGY | Facility: CLINIC | Age: 78
End: 2023-08-15
Payer: MEDICARE

## 2023-08-15 VITALS — DIASTOLIC BLOOD PRESSURE: 80 MMHG | HEART RATE: 85 BPM | SYSTOLIC BLOOD PRESSURE: 147 MMHG

## 2023-08-15 DIAGNOSIS — N28.1 COMPLEX RENAL CYST: Primary | ICD-10-CM

## 2023-08-15 DIAGNOSIS — N28.1 RENAL CYST: ICD-10-CM

## 2023-08-15 DIAGNOSIS — I10 HYPERTENSION, UNSPECIFIED TYPE: ICD-10-CM

## 2023-08-15 PROCEDURE — 1125F PR PAIN SEVERITY QUANTIFIED, PAIN PRESENT: ICD-10-PCS | Mod: HCNC,CPTII,S$GLB, | Performed by: UROLOGY

## 2023-08-15 PROCEDURE — 3079F DIAST BP 80-89 MM HG: CPT | Mod: HCNC,CPTII,S$GLB, | Performed by: UROLOGY

## 2023-08-15 PROCEDURE — 99204 PR OFFICE/OUTPT VISIT, NEW, LEVL IV, 45-59 MIN: ICD-10-PCS | Mod: HCNC,S$GLB,, | Performed by: UROLOGY

## 2023-08-15 PROCEDURE — 1160F PR REVIEW ALL MEDS BY PRESCRIBER/CLIN PHARMACIST DOCUMENTED: ICD-10-PCS | Mod: HCNC,CPTII,S$GLB, | Performed by: UROLOGY

## 2023-08-15 PROCEDURE — 1101F PR PT FALLS ASSESS DOC 0-1 FALLS W/OUT INJ PAST YR: ICD-10-PCS | Mod: HCNC,CPTII,S$GLB, | Performed by: UROLOGY

## 2023-08-15 PROCEDURE — 1101F PT FALLS ASSESS-DOCD LE1/YR: CPT | Mod: HCNC,CPTII,S$GLB, | Performed by: UROLOGY

## 2023-08-15 PROCEDURE — 1159F MED LIST DOCD IN RCRD: CPT | Mod: HCNC,CPTII,S$GLB, | Performed by: UROLOGY

## 2023-08-15 PROCEDURE — 1159F PR MEDICATION LIST DOCUMENTED IN MEDICAL RECORD: ICD-10-PCS | Mod: HCNC,CPTII,S$GLB, | Performed by: UROLOGY

## 2023-08-15 PROCEDURE — 99204 OFFICE O/P NEW MOD 45 MIN: CPT | Mod: HCNC,S$GLB,, | Performed by: UROLOGY

## 2023-08-15 PROCEDURE — 1160F RVW MEDS BY RX/DR IN RCRD: CPT | Mod: HCNC,CPTII,S$GLB, | Performed by: UROLOGY

## 2023-08-15 PROCEDURE — 3077F SYST BP >= 140 MM HG: CPT | Mod: HCNC,CPTII,S$GLB, | Performed by: UROLOGY

## 2023-08-15 PROCEDURE — 3077F PR MOST RECENT SYSTOLIC BLOOD PRESSURE >= 140 MM HG: ICD-10-PCS | Mod: HCNC,CPTII,S$GLB, | Performed by: UROLOGY

## 2023-08-15 PROCEDURE — 3079F PR MOST RECENT DIASTOLIC BLOOD PRESSURE 80-89 MM HG: ICD-10-PCS | Mod: HCNC,CPTII,S$GLB, | Performed by: UROLOGY

## 2023-08-15 PROCEDURE — 3288F FALL RISK ASSESSMENT DOCD: CPT | Mod: HCNC,CPTII,S$GLB, | Performed by: UROLOGY

## 2023-08-15 PROCEDURE — 99999 PR PBB SHADOW E&M-EST. PATIENT-LVL III: CPT | Mod: PBBFAC,HCNC,, | Performed by: UROLOGY

## 2023-08-15 PROCEDURE — 1125F AMNT PAIN NOTED PAIN PRSNT: CPT | Mod: HCNC,CPTII,S$GLB, | Performed by: UROLOGY

## 2023-08-15 PROCEDURE — 99999 PR PBB SHADOW E&M-EST. PATIENT-LVL III: ICD-10-PCS | Mod: PBBFAC,HCNC,, | Performed by: UROLOGY

## 2023-08-15 PROCEDURE — 3288F PR FALLS RISK ASSESSMENT DOCUMENTED: ICD-10-PCS | Mod: HCNC,CPTII,S$GLB, | Performed by: UROLOGY

## 2023-08-15 RX ORDER — LOSARTAN POTASSIUM 25 MG/1
25 TABLET ORAL NIGHTLY
Qty: 90 TABLET | Refills: 3 | Status: SHIPPED | OUTPATIENT
Start: 2023-08-15 | End: 2024-01-26 | Stop reason: SDUPTHER

## 2023-08-15 NOTE — TELEPHONE ENCOUNTER
----- Message from Mariusz De Santiago sent at 8/15/2023  2:28 PM CDT -----  Regarding: Refill  Contact: patient  Type:  RX Refill Request    Who Called: patient  Refill or New Rx:refill  RX Name and Strength:losartan (COZAAR) 25 MG tablet  How is the patient currently taking it? (ex. 1XDay):  Is this a 30 day or 90 day RX:  Preferred Pharmacy with phone number:  [facility] - [facility] [address]  [address] [address]  Phone: [phone] Fax: [phone]  Local or Mail Order:mail  Ordering Provider:Rosenda Arguelles  Would the patient rather a call back or a response via [facility]? call  Best Call Back Number:[phone]  Additional Information: refill

## 2023-08-15 NOTE — PATIENT INSTRUCTIONS
Gala Gaines is a 77 y.o. female with     1. Complex renal cyst        Bosniak 2. Essentially unchanged, only 2mm larger in 4 years. Almost 100% benign. Can do an ultrasound in 1-2 yrs.     Plan:     F/u in 2 years with rbus prior    Strong family hx of multiple cancers. May be a candidate for myrisk. Can defer to her pcp or her oncologist whom she sees bc of her high risk to order. I do not think she is increased risk of this being kd cancer but unchanged essentially in 4 year.      The patient has a Category 2 cyst. You do not really follow up of this cyst.  We will repeat imaging in 24 months.  See below for classifications.     Bosniak Renal Cyst Classification    Category 1 - simple cyst, anechoic, imperceptable wall, rounded.   Work up - none  % malignant - essentially zero    Category 2 - minimally complex, single thin (< 1 mm) septations, thin calcifications, non-enhancing high-attenuation renal lesions < 3 cm are also included in his category; these lesions are generally well-marginated. Benign cystic lesions in which there may be a few thin septa and the wall or septa may contain fine calcifications or a short segment of slightly thickened calcification. This category also includes uniformly high-attenuation lesions that are <3 cm in diameter, well marginated, and nonenhancing  Work up - none  % malignant - essentially zero    Category 2F - minimally complex but requiring follow up.  Increased number of septa, minimally thickened or enhancing septa or wall; thick calcifications, hyperdense cyst that is > 3 cm, mostly intrarenal (< 25% of wall visible); no enhancement. Cysts in this category are generally well marginated and are more complicated than category II cysts but less complicated than category III cysts. They may have multiple thin septa or minimal smooth thickening of the septa or wall, which may contain calcification that may also be thick and nodular. They have no measurable contrast  enhancement. However, these lesions may have subjectively perceived enhancement of the septa or wall when the unenhanced and contrast-enhanced images are compared. This category also includes totally intrarenal, nonenhancing, high-attenuating lesions that are >3 cm in diameter. These cysts require follow-up to ascertain that they are not malignant.  Work up - need US or CT follow up  % malignant - 5 %    Category 3 - indeterminate, thick or multiple septations, mural nodule, hyperdense on CT (see 2 F). Indeterminate cystic masses that have thickened, irregular or smooth walls or septa. Measurable enhancement is present. Approximately 40 to 60 percent are malignant (cystic renal cell carcinoma and multiloculated cystic renal cell carcinoma) [3-5]. The remaining lesions are benign and include hemorrhagic cysts, chronic infected cysts, and multiloculated cystic nephroma.  Treatment/work up - partial nephrectomy, RFA, or observation in elderly/poor surgical candidate  % malignant about 50%    Category 4 - solid mass with cystic spaces, category IV lesions have all the characteristics of category III cysts, plus they contain enhancing soft-tissue components that are adjacent to and independent of the wall or septum (image 2). Approximately 85 to 100 percent of category IV lesions have been reported to be malignant in various studies  treatment - partial or total nephrectomy  % malignant - 90%      We went over Simple Kidney Cysts  Simple kidney cysts are fluid-filled sacs that form in your kidneys. These cysts usually dont affect how the kidneys function. Simple kidney cysts are very common. They rarely need treatment. Most people dont even know that they have them. Simple cysts mean they have a thin wall and contain water-like fluid. Renal cysts become fairly common as people age and usually do not cause symptoms or harm.    What causes simple kidney cysts?  Researchers are still not sure what causes simple kidney  cysts. You might have a single kidney cyst. Or you might have more than one. You might have them on only 1 kidney or on both of them. In most cases, a person has only 1 cyst. Over time the cyst may slowly increase in size.  Some health conditions can cause kidney cysts to grow. For example, a person with polycystic kidney disease (usually inherited) develops a large number of kidney cysts. Too many cysts can keep the kidney from working properly.  Other health conditions that can cause simple kidney cysts include:  Chronic kidney disease  Dialysis for chronic kidney disease  Medullary cystic kidney disease  Autosomal dominant polycystic kidney disease  Von Hippel-Lindau disease  Tuberous sclerosis complex  If you smoke or have high blood pressure, you may have a higher risk for a simple kidney cyst.    Symptoms of simple kidney cysts  Simple kidney cysts often dont cause symptoms. In rare cases, they may cause symptoms such as:  Blood in your urine if the cyst bursts  Pain in your upper belly or back if the cyst bursts  Fever and chills if the cyst is infected  High blood pressure if the cyst compresses the rest of the kidney  Trouble urinating if the cyst blocks the tube that sends urine from the kidneys to the bladder (ureter)    A simple kidney cyst usually doesnt greatly impair kidney function unless it blocks the ureter.   Diagnosing simple kidney cysts  Simple kidney cysts are often first found with an imaging test that was done for another reason (this is called incidental)  It is important to distinguish simple kidney cysts from complex cysts. Complex cysts are a different kind of cyst that can become cancer. A complex cyst needs to be monitored with periodic images (usually every 6 months).  Kidney ultrasound  Kidney CT scan, if a detailed picture of the cyst is needed  Kidney MRI, if more information is needed about the cyst    Can a cyst on your kidney be painful?  Usually cysts DO NOT cause pain.  However, in rare cases simple kidney cysts can grow large enough and cause a dull pain in a person's back, side or upper abdomen. Rarely, the cysts can also get infected, causing pain, fever and tenderness. Rarely they can burst, also causing pain.   Again, simple kidney cysts rarely harm kidneys or impair their ability to function.    What is a parapelvic cyst?  It's a cyst(s) adjacent to the pelvis of the kidney. The pelvis is where the urine collects before it drains down the ureter into the bladder. It's difficult to tell whether or not you have a parapelvic cysts verses backup of urine (from a kidney stone, tumor, scar tissue). You need a CT scan with IV dye that outlines the urine collecting system. The cysts will show up black and the urine will show up white. Parapelvic cysts, once diagnosed, are benign and usually need no further follow-up. However, rarely they can grow and prevent the urine from draining. More often, they are confused with backup of urine, otherwise known as hydronephrosis. Hydronephrosis is more serious and needs further evaluation to see why the urine is not draining.

## 2023-11-22 ENCOUNTER — HOSPITAL ENCOUNTER (OUTPATIENT)
Dept: RADIOLOGY | Facility: HOSPITAL | Age: 78
Discharge: HOME | End: 2023-11-22
Attending: FAMILY MEDICINE
Payer: MEDICARE

## 2023-11-22 DIAGNOSIS — Z80.8 FAMILY HISTORY OF THYROID CANCER: ICD-10-CM

## 2023-11-22 PROCEDURE — 76536 US EXAM OF HEAD AND NECK: CPT | Mod: TC

## 2024-01-26 ENCOUNTER — OFFICE VISIT (OUTPATIENT)
Dept: CARDIOLOGY | Facility: CLINIC | Age: 79
End: 2024-01-26
Payer: MEDICARE

## 2024-01-26 VITALS
OXYGEN SATURATION: 98 % | HEIGHT: 65 IN | RESPIRATION RATE: 16 BRPM | WEIGHT: 148 LBS | BODY MASS INDEX: 24.66 KG/M2 | SYSTOLIC BLOOD PRESSURE: 140 MMHG | HEART RATE: 64 BPM | DIASTOLIC BLOOD PRESSURE: 62 MMHG

## 2024-01-26 DIAGNOSIS — I10 HYPERTENSION, UNSPECIFIED TYPE: ICD-10-CM

## 2024-01-26 DIAGNOSIS — E03.2 HYPOTHYROIDISM DUE TO NON-MEDICATION EXOGENOUS SUBSTANCES: ICD-10-CM

## 2024-01-26 DIAGNOSIS — I10 PRIMARY HYPERTENSION: ICD-10-CM

## 2024-01-26 DIAGNOSIS — E78.2 MIXED HYPERLIPIDEMIA: ICD-10-CM

## 2024-01-26 DIAGNOSIS — K21.9 GASTROESOPHAGEAL REFLUX DISEASE WITHOUT ESOPHAGITIS: ICD-10-CM

## 2024-01-26 DIAGNOSIS — I70.0 ATHEROSCLEROSIS OF ABDOMINAL AORTA: Primary | ICD-10-CM

## 2024-01-26 PROCEDURE — 1101F PT FALLS ASSESS-DOCD LE1/YR: CPT | Mod: HCNC,CPTII,S$GLB, | Performed by: INTERNAL MEDICINE

## 2024-01-26 PROCEDURE — 3077F SYST BP >= 140 MM HG: CPT | Mod: HCNC,CPTII,S$GLB, | Performed by: INTERNAL MEDICINE

## 2024-01-26 PROCEDURE — 1160F RVW MEDS BY RX/DR IN RCRD: CPT | Mod: HCNC,CPTII,S$GLB, | Performed by: INTERNAL MEDICINE

## 2024-01-26 PROCEDURE — 1126F AMNT PAIN NOTED NONE PRSNT: CPT | Mod: HCNC,CPTII,S$GLB, | Performed by: INTERNAL MEDICINE

## 2024-01-26 PROCEDURE — 1159F MED LIST DOCD IN RCRD: CPT | Mod: HCNC,CPTII,S$GLB, | Performed by: INTERNAL MEDICINE

## 2024-01-26 PROCEDURE — 99214 OFFICE O/P EST MOD 30 MIN: CPT | Mod: HCNC,S$GLB,, | Performed by: INTERNAL MEDICINE

## 2024-01-26 PROCEDURE — 3288F FALL RISK ASSESSMENT DOCD: CPT | Mod: HCNC,CPTII,S$GLB, | Performed by: INTERNAL MEDICINE

## 2024-01-26 PROCEDURE — 3078F DIAST BP <80 MM HG: CPT | Mod: HCNC,CPTII,S$GLB, | Performed by: INTERNAL MEDICINE

## 2024-01-26 PROCEDURE — 99999 PR PBB SHADOW E&M-EST. PATIENT-LVL IV: CPT | Mod: PBBFAC,HCNC,, | Performed by: INTERNAL MEDICINE

## 2024-01-26 RX ORDER — LOSARTAN POTASSIUM 25 MG/1
25 TABLET ORAL 2 TIMES DAILY
Qty: 180 TABLET | Refills: 3 | Status: SHIPPED | OUTPATIENT
Start: 2024-01-26 | End: 2025-01-25

## 2024-01-26 NOTE — ASSESSMENT & PLAN NOTE
Continue on risk factor modification she is tolerating the present therapy with Nexletol 180/10 mg daily.  And continue on low-fat low-cholesterol diet

## 2024-01-26 NOTE — PROGRESS NOTES
Subjective:    Patient ID:  Gala Gaines is a 78 y.o. female patient here for evaluation Follow-up (She has been having some elevated blood pressures)      History of Present Illness:     Is 78-year-old seeking follow-up evaluation doing very well from cardiac standpoint.  She did notice her blood pressure to be slightly elevated at home in the range of 140s.  No headaches no double vision no chest discomfort shortness of breath in fact she feels like she has more energy in the last few days.  No cough congestion no fevers or chills noted.  She is still worries about the health of her children who have some new medical issues.        Review of patient's allergies indicates:   Allergen Reactions    Levofloxacin      Other reaction(s): fainting spells  Other reaction(s): fainting spells    Nuts  [tree nut]        Past Medical History:   Diagnosis Date    Coronary artery disease     Migraine headache     Thyroid disease      Past Surgical History:   Procedure Laterality Date    APPENDECTOMY       SECTION      CHOLECYSTECTOMY      HYSTERECTOMY      SHOULDER ARTHROSCOPY      right    TONSILLECTOMY       Social History     Tobacco Use    Smoking status: Never    Smokeless tobacco: Never   Substance Use Topics    Alcohol use: Yes     Comment: occasional    Drug use: No        Review of Systems:    As noted in HPI in addition      REVIEW OF SYSTEMS  CARDIOVASCULAR: No recent chest pain, palpitations, arm, neck, or jaw pain  RESPIRATORY: No recent fever, cough chills, SOB or congestion  : No blood in the urine  GI: No Nausea, vomiting, constipation, diarrhea, blood, or reflux.  MUSCULOSKELETAL: No myalgias  NEURO: No lightheadedness or dizziness  EYES: No Double vision, blurry, vision or headache              Objective        Vitals:    24 0858   BP: (!) 140/62   Pulse: 64   Resp: 16       LIPIDS - LAST 2   Lab Results   Component Value Date    CHOL 200 (H) 2022    CHOL 175 2019    HDL 66  11/09/2022    HDL 62 08/23/2019    LDLCALC 121.0 11/09/2022    LDLCALC 99.0 08/23/2019    TRIG 65 11/09/2022    TRIG 70 08/23/2019    CHOLHDL 33.0 11/09/2022    CHOLHDL 35.4 08/23/2019       CBC - LAST 2  Lab Results   Component Value Date    WBC 6.29 07/21/2023    WBC 8.42 11/09/2022    RBC 3.81 (L) 07/21/2023    RBC 3.74 (L) 11/09/2022    HGB 11.7 (L) 07/21/2023    HGB 11.6 (L) 11/09/2022    HCT 37.7 07/21/2023    HCT 37.4 11/09/2022    MCV 99 (H) 07/21/2023     (H) 11/09/2022    MCH 30.7 07/21/2023    MCH 31.0 11/09/2022    MCHC 31.0 (L) 07/21/2023    MCHC 31.0 (L) 11/09/2022    RDW 14.4 07/21/2023    RDW 15.0 (H) 11/09/2022     07/21/2023     11/09/2022    MPV 10.2 07/21/2023    MPV 10.6 11/09/2022    GRAN 3.6 07/21/2023    GRAN 57.6 07/21/2023    LYMPH 2.0 07/21/2023    LYMPH 32.3 07/21/2023    MONO 0.5 07/21/2023    MONO 7.2 07/21/2023    BASO 0.03 07/21/2023    BASO 0.05 11/09/2022    NRBC 0 07/21/2023    NRBC 0 11/09/2022       CHEMISTRY & LIVER FUNCTION - LAST 2  Lab Results   Component Value Date     07/21/2023     11/09/2022    K 4.2 07/21/2023    K 3.8 11/09/2022     07/21/2023     11/09/2022    CO2 23 07/21/2023    CO2 23 11/09/2022    ANIONGAP 8 07/21/2023    ANIONGAP 8 11/09/2022    BUN 14 07/21/2023    BUN 20 11/09/2022    CREATININE 0.7 07/21/2023    CREATININE 0.7 11/09/2022    GLU 91 07/21/2023    GLU 86 11/09/2022    CALCIUM 9.5 07/21/2023    CALCIUM 9.2 11/09/2022    MG 1.9 09/29/2019    ALBUMIN 4.0 07/21/2023    ALBUMIN 4.1 11/09/2022    PROT 7.1 07/21/2023    PROT 6.9 11/09/2022    ALKPHOS 77 07/21/2023    ALKPHOS 70 11/09/2022    ALT 9 (L) 07/21/2023    ALT 13 11/09/2022    AST 14 07/21/2023    AST 16 11/09/2022    BILITOT 0.2 07/21/2023    BILITOT 0.3 11/09/2022        CARDIAC PROFILE - LAST 2  Lab Results   Component Value Date    TROPONINI <0.030 09/30/2019    TROPONINI <0.030 09/30/2019        COAGULATION - LAST 2  Lab Results   Component Value  Date    LABPT 13.1 09/29/2019    INR 1.0 09/29/2019       ENDOCRINE & PSA - LAST 2  Lab Results   Component Value Date    HGBA1C 5.2 07/21/2023    HGBA1C 5.3 11/09/2022    TSH 1.556 07/21/2023    TSH 1.461 11/09/2022        ECHOCARDIOGRAM RESULTS  No results found for this or any previous visit.      CURRENT/PREVIOUS VISIT EKG  Results for orders placed or performed in visit on 04/10/23   IN OFFICE EKG 12-LEAD (to [place])    Collection Time: 04/10/23 10:37 AM    Narrative    Test Reason : E78.00,I70.0,R07.89,    Vent. Rate : 071 BPM     Atrial Rate : 071 BPM     P-R Int : 164 ms          QRS Dur : 084 ms      QT Int : 404 ms       P-R-T Axes : 041 007 035 degrees     QTc Int : 439 ms    Normal sinus rhythm  Normal ECG  When compared with ECG of 29-SEP-2019 23:14,  No significant change was found  Confirmed by Hebert Zamora MD ([id number]) on 4/14/2023 12:12:34 PM    Referred By:             Confirmed By:Hebert Zamora MD     No valid procedures specified.   Results for orders placed during the hospital encounter of 04/19/23    Nuclear Stress - Cardiology Interpreted    Interpretation Summary    Abnormal myocardial perfusion scan.  There is no evidence of reversible ischemia seen.    There are two significant perfusion abnormalities.    Perfusion Abnormality #1 - There is a moderate to severe intensity, small to moderate sized, equivocal perfusion abnormality that is fixed in the anteroapical wall(s). This finding is equivocal due to a breast shadow overlying the myocardium.    Perfusion Abnormality #2 - There is a small to moderate sized, moderate intensity, fixed perfusion abnormality consistent with scar in the basal to apical inferior wall(s).    There are no other significant perfusion abnormalities.    There is a mild to moderate intensity perfusion abnormality in the anteroapical wall of the left ventricle, secondary to breast attenuation.    There is a  mild to moderate intensity fixed perfusion abnormality in  the inferior wall of the left ventricle secondary to diaphragm attenuation.    The gated perfusion images showed an ejection fraction of 68% at rest. The gated perfusion images showed an ejection fraction of 70% post stress. Normal ejection fraction is greater than 53%.    There is normal wall motion at rest and post stress.    LV cavity size is normal at rest and normal at stress.    The ECG portion of the study is negative for ischemia.    There were no arrhythmias during stress.    No valid procedures specified.    PHYSICAL EXAM  CONSTITUTIONAL: Well built, well nourished in no apparent distress  NECK: no carotid bruit, no JVD  LUNGS: CTA  CHEST WALL: no tenderness  HEART: regular rate and rhythm, S1, S2 normal, no murmur, click, rub or gallop   ABDOMEN: soft, non-tender; bowel sounds normal; no masses,  no organomegaly  EXTREMITIES: Extremities normal, no edema, no calf tenderness noted  NEURO: AAO X 3    I HAVE REVIEWED :    The vital signs, nurses notes, and all the pertinent radiology and labs.        Current Outpatient Medications   Medication Instructions    acai berry extract 500 mg Cap Oral    aspirin (ECOTRIN) 81 mg, Oral, Daily    bempedoic acid-ezetimibe 180-10 mg Tab 1 tablet, Oral, Nightly    calcium carbonate (CALCIUM 300 ORAL) Oral    codeine-butalbital-ASA-caffeine (BUTALBITAL COMPOUND W/CODEINE) 58--40 mg Cap Take by mouth.    coenzyme Q10 100 mg, Oral, Daily    DULoxetine (CYMBALTA) 60 MG capsule Take by mouth.    erythromycin (ROMYCIN) ophthalmic ointment No dose, route, or frequency recorded.    HYDROcodone-acetaminophen (NORCO)  mg per tablet hydrocodone 10 mg-acetaminophen 325 mg tablet    KRILL OIL ORAL Oral    levothyroxine (SYNTHROID) 75 mcg, Oral, Before breakfast    losartan (COZAAR) 25 mg, Oral, Nightly    mv-min/vit C/glut/lysine/hb124 (IMMUNE SUPPORT ORAL) Oral    neomycin-polymyxin-dexamethasone (DEXACINE) 3.5 mg/g-10,000 unit/g-0.1 % Oint No dose, route, or frequency  recorded.    omeprazole-sodium bicarbonate 20-1.1 mg-gram Cap 60 mg, Oral, Daily    oxybutynin (DITROPAN-XL) 5 mg, Oral, Daily    tobramycin sulfate 0.3% (TOBREX) 0.3 % ophthalmic solution No dose, route, or frequency recorded.    topiramate (TOPAMAX) 100 mg, Oral, 2 times daily    vit A/vit C/vit E/zinc/copper (EYE MULTIVITAMIN ORAL) Oral          Assessment & Plan     Atherosclerosis of abdominal aorta  Continue on risk factor modification she is tolerating the present therapy with Nexletol 180/10 mg daily.  And continue on low-fat low-cholesterol diet    Hyperlipidemia  Continue on present therapy she is tolerating this very well.  She is history of severe statin intolerance however she is tolerating the present therapy.  Maintain low-fat diet she intends to start on possible Mediterranean diet    Hypertension  Blood pressure is marginally elevated today and has it has been running in the 140s at home recommend to include increase losartan to 25 mg twice a day and see how she does.  If the blood pressure improves back to below 110 at all time cut down to once a day dosing again.  Encouraged her to watch his salt intake and continue on daily physical activity has a weather improves    Hypothyroid  She is on levothyroxine 75 mcg a day continue the same    Gastroesophageal reflux disease without esophagitis  Continue on omeprazole 60 mg daily maintain the same          Follow up in about 6 months (around 7/26/2024).

## 2024-01-26 NOTE — ASSESSMENT & PLAN NOTE
Blood pressure is marginally elevated today and has it has been running in the 140s at home recommend to include increase losartan to 25 mg twice a day and see how she does.  If the blood pressure improves back to below 110 at all time cut down to once a day dosing again.  Encouraged her to watch his salt intake and continue on daily physical activity has a weather improves

## 2024-01-26 NOTE — ASSESSMENT & PLAN NOTE
Continue on present therapy she is tolerating this very well.  She is history of severe statin intolerance however she is tolerating the present therapy.  Maintain low-fat diet she intends to start on possible Mediterranean diet

## 2024-01-27 ENCOUNTER — LAB VISIT (OUTPATIENT)
Dept: LAB | Facility: HOSPITAL | Age: 79
End: 2024-01-27
Attending: INTERNAL MEDICINE
Payer: MEDICARE

## 2024-01-27 DIAGNOSIS — E78.2 MIXED HYPERLIPIDEMIA: ICD-10-CM

## 2024-01-27 DIAGNOSIS — I70.0 ATHEROSCLEROSIS OF ABDOMINAL AORTA: ICD-10-CM

## 2024-01-27 LAB
ALBUMIN SERPL BCP-MCNC: 4.4 G/DL (ref 3.5–5.2)
ALP SERPL-CCNC: 72 U/L (ref 55–135)
ALT SERPL W/O P-5'-P-CCNC: 36 U/L (ref 10–44)
ANION GAP SERPL CALC-SCNC: 6 MMOL/L (ref 8–16)
AST SERPL-CCNC: 46 U/L (ref 10–40)
BILIRUB SERPL-MCNC: 0.5 MG/DL (ref 0.1–1)
BUN SERPL-MCNC: 24 MG/DL (ref 8–23)
CALCIUM SERPL-MCNC: 9.5 MG/DL (ref 8.7–10.5)
CHLORIDE SERPL-SCNC: 109 MMOL/L (ref 95–110)
CHOLEST SERPL-MCNC: 250 MG/DL (ref 120–199)
CHOLEST/HDLC SERPL: 3.5 {RATIO} (ref 2–5)
CO2 SERPL-SCNC: 27 MMOL/L (ref 23–29)
CREAT SERPL-MCNC: 0.7 MG/DL (ref 0.5–1.4)
EST. GFR  (NO RACE VARIABLE): >60 ML/MIN/1.73 M^2
GLUCOSE SERPL-MCNC: 99 MG/DL (ref 70–110)
HDLC SERPL-MCNC: 71 MG/DL (ref 40–75)
HDLC SERPL: 28.4 % (ref 20–50)
LDLC SERPL CALC-MCNC: 161.4 MG/DL (ref 63–159)
NONHDLC SERPL-MCNC: 179 MG/DL
POTASSIUM SERPL-SCNC: 4.3 MMOL/L (ref 3.5–5.1)
PROT SERPL-MCNC: 7 G/DL (ref 6–8.4)
SODIUM SERPL-SCNC: 142 MMOL/L (ref 136–145)
TRIGL SERPL-MCNC: 88 MG/DL (ref 30–150)

## 2024-01-27 PROCEDURE — 80061 LIPID PANEL: CPT | Performed by: INTERNAL MEDICINE

## 2024-01-27 PROCEDURE — 80053 COMPREHEN METABOLIC PANEL: CPT | Performed by: INTERNAL MEDICINE

## 2024-01-27 PROCEDURE — 36415 COLL VENOUS BLD VENIPUNCTURE: CPT | Performed by: INTERNAL MEDICINE

## 2024-02-02 ENCOUNTER — PATIENT MESSAGE (OUTPATIENT)
Dept: CARDIOLOGY | Facility: CLINIC | Age: 79
End: 2024-02-02
Payer: MEDICARE

## 2024-02-06 ENCOUNTER — PATIENT MESSAGE (OUTPATIENT)
Dept: ADMINISTRATIVE | Facility: HOSPITAL | Age: 79
End: 2024-02-06
Payer: MEDICARE

## 2024-05-03 ENCOUNTER — PATIENT OUTREACH (OUTPATIENT)
Dept: ADMINISTRATIVE | Facility: HOSPITAL | Age: 79
End: 2024-05-03
Payer: MEDICARE

## 2024-05-03 NOTE — PROGRESS NOTES
Population Health Chart Review & Patient Outreach Details      Additional [facility] Health Notes:      Uncontrolled BP REPORT  BP Readings from Last 3 Encounters:   01/26/24 (!) 140/62   08/15/23 (!) 147/80   07/21/23 122/68            Updates Requested / Reviewed:      Updated Care Coordination Note         Health Maintenance Topics Overdue:      VBHM Score: 1     Uncontrolled BP    RSV Vaccine                  Health Maintenance Topic(s) Outreach Outcomes & Actions Taken:    Blood Pressure - Outreach Outcomes & Actions Taken  : Primary Care Follow Up Visit Scheduled

## 2024-05-17 ENCOUNTER — OFFICE VISIT (OUTPATIENT)
Dept: FAMILY MEDICINE | Facility: CLINIC | Age: 79
End: 2024-05-17
Payer: MEDICARE

## 2024-05-17 VITALS
SYSTOLIC BLOOD PRESSURE: 132 MMHG | RESPIRATION RATE: 16 BRPM | OXYGEN SATURATION: 97 % | WEIGHT: 143.94 LBS | HEIGHT: 65 IN | TEMPERATURE: 98 F | HEART RATE: 77 BPM | BODY MASS INDEX: 23.98 KG/M2 | DIASTOLIC BLOOD PRESSURE: 72 MMHG

## 2024-05-17 DIAGNOSIS — R53.83 FATIGUE, UNSPECIFIED TYPE: ICD-10-CM

## 2024-05-17 DIAGNOSIS — M54.9 DORSALGIA: Primary | ICD-10-CM

## 2024-05-17 DIAGNOSIS — R79.9 ABNORMAL BLOOD FINDING: ICD-10-CM

## 2024-05-17 DIAGNOSIS — I10 HYPERTENSION, UNSPECIFIED TYPE: ICD-10-CM

## 2024-05-17 DIAGNOSIS — E78.2 MIXED HYPERLIPIDEMIA: ICD-10-CM

## 2024-05-17 DIAGNOSIS — E53.8 B12 DEFICIENCY: ICD-10-CM

## 2024-05-17 PROCEDURE — 1159F MED LIST DOCD IN RCRD: CPT | Mod: CPTII,S$GLB,, | Performed by: FAMILY MEDICINE

## 2024-05-17 PROCEDURE — 3288F FALL RISK ASSESSMENT DOCD: CPT | Mod: CPTII,S$GLB,, | Performed by: FAMILY MEDICINE

## 2024-05-17 PROCEDURE — G2211 COMPLEX E/M VISIT ADD ON: HCPCS | Mod: S$GLB,,, | Performed by: FAMILY MEDICINE

## 2024-05-17 PROCEDURE — 99999 PR PBB SHADOW E&M-EST. PATIENT-LVL V: CPT | Mod: PBBFAC,,, | Performed by: FAMILY MEDICINE

## 2024-05-17 PROCEDURE — 3075F SYST BP GE 130 - 139MM HG: CPT | Mod: CPTII,S$GLB,, | Performed by: FAMILY MEDICINE

## 2024-05-17 PROCEDURE — 99214 OFFICE O/P EST MOD 30 MIN: CPT | Mod: S$GLB,,, | Performed by: FAMILY MEDICINE

## 2024-05-17 PROCEDURE — 3078F DIAST BP <80 MM HG: CPT | Mod: CPTII,S$GLB,, | Performed by: FAMILY MEDICINE

## 2024-05-17 PROCEDURE — 1101F PT FALLS ASSESS-DOCD LE1/YR: CPT | Mod: CPTII,S$GLB,, | Performed by: FAMILY MEDICINE

## 2024-05-17 PROCEDURE — 1126F AMNT PAIN NOTED NONE PRSNT: CPT | Mod: CPTII,S$GLB,, | Performed by: FAMILY MEDICINE

## 2024-05-17 RX ORDER — CYANOCOBALAMIN, ISOPROPYL ALCOHOL 1000MCG/ML
1 KIT INJECTION
Qty: 3 KIT | Refills: 3 | Status: SHIPPED | OUTPATIENT
Start: 2024-05-17

## 2024-05-17 NOTE — PROGRESS NOTES
Subjective:   Patient ID: Gala Gaines is a 78 y.o. female     Chief Complaint:Annual Exam      Here for checkup      Review of Systems   Respiratory:  Negative for shortness of breath.    Cardiovascular:  Negative for chest pain.   Gastrointestinal:  Negative for abdominal pain.   Genitourinary:  Negative for dysuria.     Past Medical History:   Diagnosis Date    Coronary artery disease     Migraine headache     Thyroid disease      Past Surgical History:   Procedure Laterality Date    APPENDECTOMY       SECTION      CHOLECYSTECTOMY      HYSTERECTOMY      SHOULDER ARTHROSCOPY      right    TONSILLECTOMY       Objective:     Vitals:    24 1449   BP: 132/72   Pulse: 77   Resp: 16   Temp: 97.7 °F (36.5 °C)     Body mass index is 23.96 kg/m².  Physical Exam  Vitals and nursing note reviewed.   Constitutional:       Appearance: She is well-developed.   HENT:      Head: Normocephalic and atraumatic.   Eyes:      General: No scleral icterus.     Conjunctiva/sclera: Conjunctivae normal.   Cardiovascular:      Heart sounds: No murmur heard.  Pulmonary:      Effort: Pulmonary effort is normal. No respiratory distress.   Musculoskeletal:         General: No deformity. Normal range of motion.      Cervical back: Normal range of motion and neck supple.   Skin:     Coloration: Skin is not pale.      Findings: No rash.   Neurological:      Mental Status: She is alert and oriented to person, place, and time.   Psychiatric:         Behavior: Behavior normal.         Thought Content: Thought content normal.         Judgment: Judgment normal.       Assessment:     1. Dorsalgia    2. Fatigue, unspecified type    3. Hypertension, unspecified type    4. Mixed hyperlipidemia    5. Abnormal blood finding    6. B12 deficiency      Plan:   Dorsalgia  -     X-Ray Lumbar Spine AP And Lateral; Future; Expected date: 2024  -     X-Ray Thoracic Spine 4 or more views; Future; Expected date: 2024    Fatigue,  unspecified type  -     Comprehensive Metabolic Panel; Future; Expected date: 05/17/2024  -     Hemoglobin A1C; Future; Expected date: 05/17/2024  -     Lipid Panel; Future; Expected date: 05/17/2024  -     CBC Auto Differential; Future; Expected date: 05/17/2024  -     FERRITIN; Future; Expected date: 05/17/2024  -     TSH; Future; Expected date: 05/17/2024  -     VITAMIN B12; Future; Expected date: 05/17/2024  -     Vitamin D; Future; Expected date: 05/17/2024  -     IRON AND TIBC; Future; Expected date: 05/17/2024    Hypertension, unspecified type  -     Comprehensive Metabolic Panel; Future; Expected date: 05/17/2024  -     Hemoglobin A1C; Future; Expected date: 05/17/2024  -     Lipid Panel; Future; Expected date: 05/17/2024  -     CBC Auto Differential; Future; Expected date: 05/17/2024  -     FERRITIN; Future; Expected date: 05/17/2024  -     TSH; Future; Expected date: 05/17/2024  -     VITAMIN B12; Future; Expected date: 05/17/2024  -     Vitamin D; Future; Expected date: 05/17/2024  -     IRON AND TIBC; Future; Expected date: 05/17/2024    Mixed hyperlipidemia  -     Comprehensive Metabolic Panel; Future; Expected date: 05/17/2024  -     Hemoglobin A1C; Future; Expected date: 05/17/2024  -     Lipid Panel; Future; Expected date: 05/17/2024  -     CBC Auto Differential; Future; Expected date: 05/17/2024  -     FERRITIN; Future; Expected date: 05/17/2024  -     TSH; Future; Expected date: 05/17/2024  -     VITAMIN B12; Future; Expected date: 05/17/2024  -     Vitamin D; Future; Expected date: 05/17/2024  -     IRON AND TIBC; Future; Expected date: 05/17/2024    Abnormal blood finding  -     Comprehensive Metabolic Panel; Future; Expected date: 05/17/2024  -     Hemoglobin A1C; Future; Expected date: 05/17/2024  -     Lipid Panel; Future; Expected date: 05/17/2024  -     CBC Auto Differential; Future; Expected date: 05/17/2024  -     FERRITIN; Future; Expected date: 05/17/2024  -     TSH; Future; Expected  date: 05/17/2024  -     VITAMIN B12; Future; Expected date: 05/17/2024  -     Vitamin D; Future; Expected date: 05/17/2024  -     IRON AND TIBC; Future; Expected date: 05/17/2024    B12 deficiency  -     Comprehensive Metabolic Panel; Future; Expected date: 05/17/2024  -     Hemoglobin A1C; Future; Expected date: 05/17/2024  -     Lipid Panel; Future; Expected date: 05/17/2024  -     CBC Auto Differential; Future; Expected date: 05/17/2024  -     FERRITIN; Future; Expected date: 05/17/2024  -     TSH; Future; Expected date: 05/17/2024  -     VITAMIN B12; Future; Expected date: 05/17/2024  -     Vitamin D; Future; Expected date: 05/17/2024  -     IRON AND TIBC; Future; Expected date: 05/17/2024  -     cyanocobalamin, vitamin B-12, (B-12 COMPLIANCE) 1,000 mcg/mL Kit; Inject 1 mL as directed every 30 days.  Dispense: 3 kit; Refill: 3            Total time spent of Greater than 30 minutes minutes on the day of the visit.This includes face to face time and preparing to see the patient, obtaining and reviewing separately obtained history, documenting clinical information in the electronic or other health record, independently interpreting results and communicating results to the patient/family/caregiver, or care coordinator.    Established patient with me has been instructed that must see me at least 1 time yearly (every 365 days) for refills of medications. Seeing other providers in this clinic is fine but expectation is to see me yearly.    Abdirahman Calle MD  05/17/2024    Portions of this note have been dictated with NOELLE Rodriguez

## 2024-05-17 NOTE — PATIENT INSTRUCTIONS
Toñito Cedeño,     If you are due for any health screening(s) below please notify me so we can arrange them to be ordered and scheduled to maintain your health. Most healthy patients complete it. Don't lose out on improving your health.     Tests to Keep You Healthy    Last Blood Pressure <= 139/89 (5/17/2024): NO

## 2024-05-20 ENCOUNTER — LAB VISIT (OUTPATIENT)
Dept: LAB | Facility: HOSPITAL | Age: 79
End: 2024-05-20
Attending: FAMILY MEDICINE
Payer: MEDICARE

## 2024-05-20 DIAGNOSIS — I10 HYPERTENSION, UNSPECIFIED TYPE: ICD-10-CM

## 2024-05-20 DIAGNOSIS — R79.9 ABNORMAL BLOOD FINDING: ICD-10-CM

## 2024-05-20 DIAGNOSIS — E78.2 MIXED HYPERLIPIDEMIA: ICD-10-CM

## 2024-05-20 DIAGNOSIS — R53.83 FATIGUE, UNSPECIFIED TYPE: ICD-10-CM

## 2024-05-20 DIAGNOSIS — E53.8 B12 DEFICIENCY: ICD-10-CM

## 2024-05-20 LAB
25(OH)D3+25(OH)D2 SERPL-MCNC: 68 NG/ML (ref 30–96)
ALBUMIN SERPL BCP-MCNC: 4 G/DL (ref 3.5–5.2)
ALP SERPL-CCNC: 67 U/L (ref 55–135)
ALT SERPL W/O P-5'-P-CCNC: 35 U/L (ref 10–44)
ANION GAP SERPL CALC-SCNC: 5 MMOL/L (ref 8–16)
AST SERPL-CCNC: 25 U/L (ref 10–40)
BASOPHILS # BLD AUTO: 0.05 K/UL (ref 0–0.2)
BASOPHILS NFR BLD: 0.8 % (ref 0–1.9)
BILIRUB SERPL-MCNC: 0.2 MG/DL (ref 0.1–1)
BUN SERPL-MCNC: 20 MG/DL (ref 8–23)
CALCIUM SERPL-MCNC: 9.7 MG/DL (ref 8.7–10.5)
CHLORIDE SERPL-SCNC: 110 MMOL/L (ref 95–110)
CHOLEST SERPL-MCNC: 200 MG/DL (ref 120–199)
CHOLEST/HDLC SERPL: 3.6 {RATIO} (ref 2–5)
CO2 SERPL-SCNC: 25 MMOL/L (ref 23–29)
CREAT SERPL-MCNC: 0.8 MG/DL (ref 0.5–1.4)
DIFFERENTIAL METHOD BLD: ABNORMAL
EOSINOPHIL # BLD AUTO: 0.2 K/UL (ref 0–0.5)
EOSINOPHIL NFR BLD: 2.9 % (ref 0–8)
ERYTHROCYTE [DISTWIDTH] IN BLOOD BY AUTOMATED COUNT: 14.6 % (ref 11.5–14.5)
EST. GFR  (NO RACE VARIABLE): >60 ML/MIN/1.73 M^2
ESTIMATED AVG GLUCOSE: 105 MG/DL (ref 68–131)
FERRITIN SERPL-MCNC: 114 NG/ML (ref 20–300)
GLUCOSE SERPL-MCNC: 99 MG/DL (ref 70–110)
HBA1C MFR BLD: 5.3 % (ref 4–5.6)
HCT VFR BLD AUTO: 36.8 % (ref 37–48.5)
HDLC SERPL-MCNC: 56 MG/DL (ref 40–75)
HDLC SERPL: 28 % (ref 20–50)
HGB BLD-MCNC: 12 G/DL (ref 12–16)
IMM GRANULOCYTES # BLD AUTO: 0.02 K/UL (ref 0–0.04)
IMM GRANULOCYTES NFR BLD AUTO: 0.3 % (ref 0–0.5)
IRON SERPL-MCNC: 62 UG/DL (ref 30–160)
LDLC SERPL CALC-MCNC: 135.6 MG/DL (ref 63–159)
LYMPHOCYTES # BLD AUTO: 2.2 K/UL (ref 1–4.8)
LYMPHOCYTES NFR BLD: 35.7 % (ref 18–48)
MCH RBC QN AUTO: 32.5 PG (ref 27–31)
MCHC RBC AUTO-ENTMCNC: 32.6 G/DL (ref 32–36)
MCV RBC AUTO: 100 FL (ref 82–98)
MONOCYTES # BLD AUTO: 0.4 K/UL (ref 0.3–1)
MONOCYTES NFR BLD: 6.9 % (ref 4–15)
NEUTROPHILS # BLD AUTO: 3.3 K/UL (ref 1.8–7.7)
NEUTROPHILS NFR BLD: 53.4 % (ref 38–73)
NONHDLC SERPL-MCNC: 144 MG/DL
NRBC BLD-RTO: 0 /100 WBC
PLATELET # BLD AUTO: 255 K/UL (ref 150–450)
PMV BLD AUTO: 11 FL (ref 9.2–12.9)
POTASSIUM SERPL-SCNC: 4.2 MMOL/L (ref 3.5–5.1)
PROT SERPL-MCNC: 6.7 G/DL (ref 6–8.4)
RBC # BLD AUTO: 3.69 M/UL (ref 4–5.4)
SATURATED IRON: 16 % (ref 20–50)
SODIUM SERPL-SCNC: 140 MMOL/L (ref 136–145)
TOTAL IRON BINDING CAPACITY: 382 UG/DL (ref 250–450)
TRANSFERRIN SERPL-MCNC: 258 MG/DL (ref 200–375)
TRIGL SERPL-MCNC: 42 MG/DL (ref 30–150)
TSH SERPL DL<=0.005 MIU/L-ACNC: 1.73 UIU/ML (ref 0.4–4)
VIT B12 SERPL-MCNC: >2000 PG/ML (ref 210–950)
WBC # BLD AUTO: 6.22 K/UL (ref 3.9–12.7)

## 2024-05-20 PROCEDURE — 84443 ASSAY THYROID STIM HORMONE: CPT | Performed by: FAMILY MEDICINE

## 2024-05-20 PROCEDURE — 82306 VITAMIN D 25 HYDROXY: CPT | Performed by: FAMILY MEDICINE

## 2024-05-20 PROCEDURE — 83540 ASSAY OF IRON: CPT | Performed by: FAMILY MEDICINE

## 2024-05-20 PROCEDURE — 83036 HEMOGLOBIN GLYCOSYLATED A1C: CPT | Performed by: FAMILY MEDICINE

## 2024-05-20 PROCEDURE — 36415 COLL VENOUS BLD VENIPUNCTURE: CPT | Mod: PO | Performed by: FAMILY MEDICINE

## 2024-05-20 PROCEDURE — 80053 COMPREHEN METABOLIC PANEL: CPT | Performed by: FAMILY MEDICINE

## 2024-05-20 PROCEDURE — 82728 ASSAY OF FERRITIN: CPT | Performed by: FAMILY MEDICINE

## 2024-05-20 PROCEDURE — 85025 COMPLETE CBC W/AUTO DIFF WBC: CPT | Performed by: FAMILY MEDICINE

## 2024-05-20 PROCEDURE — 80061 LIPID PANEL: CPT | Performed by: FAMILY MEDICINE

## 2024-05-20 PROCEDURE — 82607 VITAMIN B-12: CPT | Performed by: FAMILY MEDICINE

## 2024-07-01 ENCOUNTER — TELEPHONE (OUTPATIENT)
Dept: FAMILY MEDICINE | Facility: CLINIC | Age: 79
End: 2024-07-01
Payer: MEDICARE

## 2024-07-01 NOTE — TELEPHONE ENCOUNTER
Patient reports experiencing fatigue.  States in the past she was prescribed Acyclovir by an infectious disease doctor to be taken as needed for fatigue.  Patient states she has not taken this medication in years, and would like to know if Dr. Calle will prescribe this medication for her.  Writer advised an appointment would be needed to discuss this matter.  Appointment scheduled for the date of 7-2-24. Patient agreed to appointment date, time, and location.

## 2024-07-01 NOTE — TELEPHONE ENCOUNTER
Mariusz De Santiagozack [place] Staff     ----- Message from Mariusz De Santiago sent at 7/1/2024  3:43 PM CDT -----  Regarding: new medication  Contact: patient  Type:  Patient Returning Call    Who Called:patient  Who Left Message for Patient:office nurse  Does the patient know what this is regarding?:new medication  Would the patient rather a call back or a response via [facility]? Please call to advise  Best Call Back Number:[phone]  Additional Information:

## 2024-07-02 ENCOUNTER — OFFICE VISIT (OUTPATIENT)
Dept: FAMILY MEDICINE | Facility: CLINIC | Age: 79
End: 2024-07-02
Payer: MEDICARE

## 2024-07-02 VITALS
BODY MASS INDEX: 24.12 KG/M2 | TEMPERATURE: 98 F | HEIGHT: 65 IN | WEIGHT: 144.81 LBS | RESPIRATION RATE: 16 BRPM | DIASTOLIC BLOOD PRESSURE: 60 MMHG | SYSTOLIC BLOOD PRESSURE: 112 MMHG | HEART RATE: 79 BPM | OXYGEN SATURATION: 98 %

## 2024-07-02 DIAGNOSIS — B00.9 HERPES SIMPLEX TYPE 1 INFECTION: Primary | ICD-10-CM

## 2024-07-02 PROCEDURE — 99214 OFFICE O/P EST MOD 30 MIN: CPT | Mod: S$GLB,,, | Performed by: FAMILY MEDICINE

## 2024-07-02 PROCEDURE — 1159F MED LIST DOCD IN RCRD: CPT | Mod: CPTII,S$GLB,, | Performed by: FAMILY MEDICINE

## 2024-07-02 PROCEDURE — 3288F FALL RISK ASSESSMENT DOCD: CPT | Mod: CPTII,S$GLB,, | Performed by: FAMILY MEDICINE

## 2024-07-02 PROCEDURE — G2211 COMPLEX E/M VISIT ADD ON: HCPCS | Mod: S$GLB,,, | Performed by: FAMILY MEDICINE

## 2024-07-02 PROCEDURE — 3078F DIAST BP <80 MM HG: CPT | Mod: CPTII,S$GLB,, | Performed by: FAMILY MEDICINE

## 2024-07-02 PROCEDURE — 3074F SYST BP LT 130 MM HG: CPT | Mod: CPTII,S$GLB,, | Performed by: FAMILY MEDICINE

## 2024-07-02 PROCEDURE — 99999 PR PBB SHADOW E&M-EST. PATIENT-LVL V: CPT | Mod: PBBFAC,,, | Performed by: FAMILY MEDICINE

## 2024-07-02 PROCEDURE — 1101F PT FALLS ASSESS-DOCD LE1/YR: CPT | Mod: CPTII,S$GLB,, | Performed by: FAMILY MEDICINE

## 2024-07-02 RX ORDER — ACYCLOVIR 400 MG/1
400 TABLET ORAL 2 TIMES DAILY
Qty: 180 TABLET | Refills: 3 | Status: SHIPPED | OUTPATIENT
Start: 2024-07-02 | End: 2025-07-02

## 2024-07-02 NOTE — PROGRESS NOTES
Subjective:   Patient ID: Gala Gaines is a 78 y.o. female     Chief Complaint:Fatigue      Here for f/u. Notes increased fatigue for past week. Was so fatigued could not get ready for [employer].    Fatigue  Associated symptoms include fatigue. Pertinent negatives include no abdominal pain or chest pain.     Review of Systems   Constitutional:  Positive for fatigue.   Respiratory:  Negative for shortness of breath.    Cardiovascular:  Negative for chest pain.   Gastrointestinal:  Negative for abdominal pain.   Genitourinary:  Negative for dysuria.     Past Medical History:   Diagnosis Date    Coronary artery disease     Migraine headache     Thyroid disease      Past Surgical History:   Procedure Laterality Date    APPENDECTOMY       SECTION      CHOLECYSTECTOMY      HYSTERECTOMY      SHOULDER ARTHROSCOPY      right    TONSILLECTOMY       Objective:     Vitals:    24 1342   BP: 112/60   Pulse: 79   Resp: 16   Temp: 98.2 °F (36.8 °C)     Body mass index is 24.1 kg/m².  Physical Exam  Vitals and nursing note reviewed.   Constitutional:       Appearance: She is well-developed.   HENT:      Head: Normocephalic and atraumatic.   Eyes:      General: No scleral icterus.     Conjunctiva/sclera: Conjunctivae normal.   Cardiovascular:      Heart sounds: No murmur heard.  Pulmonary:      Effort: Pulmonary effort is normal. No respiratory distress.   Musculoskeletal:         General: No deformity. Normal range of motion.      Cervical back: Normal range of motion and neck supple.   Skin:     Coloration: Skin is not pale.      Findings: No rash.   Neurological:      Mental Status: She is alert and oriented to person, place, and time.   Psychiatric:         Behavior: Behavior normal.         Thought Content: Thought content normal.         Judgment: Judgment normal.       Assessment:     1. Herpes simplex type 1 infection      Plan:   Herpes simplex type 1 infection  -     acyclovir (ZOVIRAX) 400 MG tablet; Take  1 tablet (400 mg total) by mouth 2 (two) times daily.  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 Discussed this and being off lable. Does have history HSV-1 will treat for suppression.        Total time spent of Greater than 30 minutes minutes on the day of the visit.This includes face to face time and preparing to see the patient, obtaining and reviewing separately obtained history, documenting clinical information in the electronic or other health record, independently interpreting results and communicating results to the patient/family/caregiver, or care coordinator.    Established patient with me has been instructed that must see me at least 1 time yearly (every 365 days) for refills of medications. Seeing other providers in this clinic is fine but expectation is to see me yearly.    Abdirahman Calle MD  07/02/2024    Portions of this note have been dictated with NOELLE Rodriguez

## 2024-07-03 ENCOUNTER — TELEPHONE (OUTPATIENT)
Dept: CARDIOLOGY | Facility: CLINIC | Age: 79
End: 2024-07-03
Payer: MEDICARE

## 2024-07-03 NOTE — TELEPHONE ENCOUNTER
----- Message from Heather Munoz, Patient Care Assistant sent at 7/3/2024 12:39 PM CDT -----  Regarding: appointment  Contact: pt  Type: Needs Medical Advice    Who Called:  pt     Best Call Back Number: [phone] (home)     Additional Information: pt states she would like a callback regarding rescheduling her 7/3/24 appointment due to transportation. Please call to advise. Thanks!

## 2024-07-10 ENCOUNTER — HOSPITAL ENCOUNTER (OUTPATIENT)
Dept: RADIOLOGY | Facility: HOSPITAL | Age: 79
Discharge: HOME | End: 2024-07-10
Attending: FAMILY MEDICINE
Payer: MEDICARE

## 2024-07-10 ENCOUNTER — HOSPITAL ENCOUNTER (OUTPATIENT)
Dept: RADIOLOGY | Facility: HOSPITAL | Age: 79
Discharge: HOME | End: 2024-07-10
Attending: NURSE PRACTITIONER
Payer: MEDICARE

## 2024-07-10 DIAGNOSIS — Z12.31 VISIT FOR SCREENING MAMMOGRAM: ICD-10-CM

## 2024-07-10 DIAGNOSIS — M54.9 DORSALGIA: ICD-10-CM

## 2024-07-10 PROCEDURE — 72074 X-RAY EXAM THORAC SPINE4/>VW: CPT | Mod: 26,,, | Performed by: RADIOLOGY

## 2024-07-10 PROCEDURE — 72074 X-RAY EXAM THORAC SPINE4/>VW: CPT | Mod: TC,PO

## 2024-07-10 PROCEDURE — 77063 BREAST TOMOSYNTHESIS BI: CPT | Mod: 26,,, | Performed by: RADIOLOGY

## 2024-07-10 PROCEDURE — 72100 X-RAY EXAM L-S SPINE 2/3 VWS: CPT | Mod: TC,PO

## 2024-07-10 PROCEDURE — 77067 SCR MAMMO BI INCL CAD: CPT | Mod: TC,PO

## 2024-07-10 PROCEDURE — 72100 X-RAY EXAM L-S SPINE 2/3 VWS: CPT | Mod: 26,,, | Performed by: RADIOLOGY

## 2024-07-10 PROCEDURE — 77067 SCR MAMMO BI INCL CAD: CPT | Mod: 26,,, | Performed by: RADIOLOGY

## 2024-08-20 ENCOUNTER — OFFICE VISIT (OUTPATIENT)
Dept: CARDIOLOGY | Facility: CLINIC | Age: 79
End: 2024-08-20
Payer: MEDICARE

## 2024-08-20 VITALS
SYSTOLIC BLOOD PRESSURE: 118 MMHG | WEIGHT: 143 LBS | DIASTOLIC BLOOD PRESSURE: 76 MMHG | OXYGEN SATURATION: 99 % | HEIGHT: 65 IN | BODY MASS INDEX: 23.82 KG/M2 | HEART RATE: 74 BPM | RESPIRATION RATE: 16 BRPM

## 2024-08-20 DIAGNOSIS — E78.2 MIXED HYPERLIPIDEMIA: ICD-10-CM

## 2024-08-20 DIAGNOSIS — I10 PRIMARY HYPERTENSION: ICD-10-CM

## 2024-08-20 DIAGNOSIS — I70.0 ATHEROSCLEROSIS OF ABDOMINAL AORTA: Primary | ICD-10-CM

## 2024-08-20 DIAGNOSIS — E03.2 HYPOTHYROIDISM DUE TO NON-MEDICATION EXOGENOUS SUBSTANCES: ICD-10-CM

## 2024-08-20 DIAGNOSIS — K21.9 GASTROESOPHAGEAL REFLUX DISEASE WITHOUT ESOPHAGITIS: ICD-10-CM

## 2024-08-20 DIAGNOSIS — R53.82 CHRONIC FATIGUE: ICD-10-CM

## 2024-08-20 PROCEDURE — 1126F AMNT PAIN NOTED NONE PRSNT: CPT | Mod: CPTII,S$GLB,, | Performed by: INTERNAL MEDICINE

## 2024-08-20 PROCEDURE — 3078F DIAST BP <80 MM HG: CPT | Mod: CPTII,S$GLB,, | Performed by: INTERNAL MEDICINE

## 2024-08-20 PROCEDURE — 3288F FALL RISK ASSESSMENT DOCD: CPT | Mod: CPTII,S$GLB,, | Performed by: INTERNAL MEDICINE

## 2024-08-20 PROCEDURE — 99999 PR PBB SHADOW E&M-EST. PATIENT-LVL IV: CPT | Mod: PBBFAC,,, | Performed by: INTERNAL MEDICINE

## 2024-08-20 PROCEDURE — 1160F RVW MEDS BY RX/DR IN RCRD: CPT | Mod: CPTII,S$GLB,, | Performed by: INTERNAL MEDICINE

## 2024-08-20 PROCEDURE — 3074F SYST BP LT 130 MM HG: CPT | Mod: CPTII,S$GLB,, | Performed by: INTERNAL MEDICINE

## 2024-08-20 PROCEDURE — 1101F PT FALLS ASSESS-DOCD LE1/YR: CPT | Mod: CPTII,S$GLB,, | Performed by: INTERNAL MEDICINE

## 2024-08-20 PROCEDURE — 99214 OFFICE O/P EST MOD 30 MIN: CPT | Mod: S$GLB,,, | Performed by: INTERNAL MEDICINE

## 2024-08-20 PROCEDURE — 1159F MED LIST DOCD IN RCRD: CPT | Mod: CPTII,S$GLB,, | Performed by: INTERNAL MEDICINE

## 2024-08-20 NOTE — ASSESSMENT & PLAN NOTE
Maintaining on omeprazole 60 mg daily and continue the same encouraged her to take magnesium if tolerated 400 mg daily of oxide preparation.

## 2024-08-20 NOTE — ASSESSMENT & PLAN NOTE
History of chronic fatigue and acyclovir seemed to be helping her encouraged her to continue the same as directed by primary care

## 2024-08-20 NOTE — PROGRESS NOTES
Subjective:    Patient ID:  Gala Gaines is a 78 y.o. female patient here for evaluation Fatigue (She is having increased fatigue)      History of Present Illness:     Patient was 78-year-old with history of chronic fatigue is seeking follow-up evaluation.  She had history of arterial hypertension and dyslipidemia she had severe statin intolerance to several drugs and she had stopped it finally she was also noted to have HSV 1 infection in the past and she was treated with acyclovir several years ago and apparently that improved her fatigue and tiredness.  So this was restarted again more recently and fatigue seemed to have improved however after treatment for a short duration she was stopped it and promptly her fatigue returned.  So currently she is on acyclovir twice a day regimen this seemed to be helping her.    Denies having chest discomfort no significant shortness of breath no arm neck or jaw pain noted and no edema in the lower extremities mild cough is noted without congestion      Review of patient's allergies indicates:   Allergen Reactions    Levofloxacin      Other reaction(s): fainting spells  Other reaction(s): fainting spells    Nuts  [tree nut]        Past Medical History:   Diagnosis Date    Coronary artery disease     Migraine headache     Thyroid disease      Past Surgical History:   Procedure Laterality Date    APPENDECTOMY       SECTION      CHOLECYSTECTOMY      HYSTERECTOMY      SHOULDER ARTHROSCOPY      right    TONSILLECTOMY       Social History     Tobacco Use    Smoking status: Never    Smokeless tobacco: Never   Substance Use Topics    Alcohol use: Yes     Comment: occasional    Drug use: No        Review of Systems:    As noted in HPI in addition      REVIEW OF SYSTEMS  CARDIOVASCULAR: No recent chest pain, palpitations, arm, neck, or jaw pain  RESPIRATORY: No recent fever, occasional intermittent cough, denies chills, SOB or congestion  : No blood in the urine  GI: No  Nausea, vomiting, constipation, diarrhea, blood, or reflux.  MUSCULOSKELETAL: No myalgias  NEURO: No lightheadedness or dizziness  EYES: No Double vision, blurry, vision or headache              Objective        Vitals:    08/20/24 1108   BP: 118/76   Pulse: 74   Resp: 16       LIPIDS - LAST 2   Lab Results   Component Value Date    CHOL 200 (H) 05/20/2024    CHOL 250 (H) 01/27/2024    HDL 56 05/20/2024    HDL 71 01/27/2024    LDLCALC 135.6 05/20/2024    LDLCALC 161.4 (H) 01/27/2024    TRIG 42 05/20/2024    TRIG 88 01/27/2024    CHOLHDL 28.0 05/20/2024    CHOLHDL 28.4 01/27/2024       CBC - LAST 2  Lab Results   Component Value Date    WBC 6.22 05/20/2024    WBC 6.29 07/21/2023    RBC 3.69 (L) 05/20/2024    RBC 3.81 (L) 07/21/2023    HGB 12.0 05/20/2024    HGB 11.7 (L) 07/21/2023    HCT 36.8 (L) 05/20/2024    HCT 37.7 07/21/2023     (H) 05/20/2024    MCV 99 (H) 07/21/2023    MCH 32.5 (H) 05/20/2024    MCH 30.7 07/21/2023    MCHC 32.6 05/20/2024    MCHC 31.0 (L) 07/21/2023    RDW 14.6 (H) 05/20/2024    RDW 14.4 07/21/2023     05/20/2024     07/21/2023    MPV 11.0 05/20/2024    MPV 10.2 07/21/2023    GRAN 3.3 05/20/2024    GRAN 53.4 05/20/2024    LYMPH 2.2 05/20/2024    LYMPH 35.7 05/20/2024    MONO 0.4 05/20/2024    MONO 6.9 05/20/2024    BASO 0.05 05/20/2024    BASO 0.03 07/21/2023    NRBC 0 05/20/2024    NRBC 0 07/21/2023       CHEMISTRY & LIVER FUNCTION - LAST 2  Lab Results   Component Value Date     05/20/2024     01/27/2024    K 4.2 05/20/2024    K 4.3 01/27/2024     05/20/2024     01/27/2024    CO2 25 05/20/2024    CO2 27 01/27/2024    ANIONGAP 5 (L) 05/20/2024    ANIONGAP 6 (L) 01/27/2024    BUN 20 05/20/2024    BUN 24 (H) 01/27/2024    CREATININE 0.8 05/20/2024    CREATININE 0.7 01/27/2024    GLU 99 05/20/2024    GLU 99 01/27/2024    CALCIUM 9.7 05/20/2024    CALCIUM 9.5 01/27/2024    MG 1.9 09/29/2019    ALBUMIN 4.0 05/20/2024    ALBUMIN 4.4 01/27/2024    PROT  6.7 05/20/2024    PROT 7.0 01/27/2024    ALKPHOS 67 05/20/2024    ALKPHOS 72 01/27/2024    ALT 35 05/20/2024    ALT 36 01/27/2024    AST 25 05/20/2024    AST 46 (H) 01/27/2024    BILITOT 0.2 05/20/2024    BILITOT 0.5 01/27/2024        CARDIAC PROFILE - LAST 2  Lab Results   Component Value Date    TROPONINI <0.030 09/30/2019    TROPONINI <0.030 09/30/2019        COAGULATION - LAST 2  Lab Results   Component Value Date    LABPT 13.1 09/29/2019    INR 1.0 09/29/2019       ENDOCRINE & PSA - LAST 2  Lab Results   Component Value Date    HGBA1C 5.3 05/20/2024    HGBA1C 5.2 07/21/2023    TSH 1.729 05/20/2024    TSH 1.556 07/21/2023        ECHOCARDIOGRAM RESULTS  No results found for this or any previous visit.      CURRENT/PREVIOUS VISIT EKG  Results for orders placed or performed in visit on 04/10/23   IN OFFICE EKG 12-LEAD (to [place])    Collection Time: 04/10/23 10:37 AM    Narrative    Test Reason : E78.00,I70.0,R07.89,    Vent. Rate : 071 BPM     Atrial Rate : 071 BPM     P-R Int : 164 ms          QRS Dur : 084 ms      QT Int : 404 ms       P-R-T Axes : 041 007 035 degrees     QTc Int : 439 ms    Normal sinus rhythm  Normal ECG  When compared with ECG of 29-SEP-2019 23:14,  No significant change was found  Confirmed by Hebert Zamora MD ([id number]) on 4/14/2023 12:12:34 PM    Referred By:             Confirmed By:Hebert Zamora MD     No valid procedures specified.   Results for orders placed during the hospital encounter of 04/19/23    Nuclear Stress - Cardiology Interpreted    Interpretation Summary    Abnormal myocardial perfusion scan.  There is no evidence of reversible ischemia seen.    There are two significant perfusion abnormalities.    Perfusion Abnormality #1 - There is a moderate to severe intensity, small to moderate sized, equivocal perfusion abnormality that is fixed in the anteroapical wall(s). This finding is equivocal due to a breast shadow overlying the myocardium.    Perfusion Abnormality #2 - There  is a small to moderate sized, moderate intensity, fixed perfusion abnormality consistent with scar in the basal to apical inferior wall(s).    There are no other significant perfusion abnormalities.    There is a mild to moderate intensity perfusion abnormality in the anteroapical wall of the left ventricle, secondary to breast attenuation.    There is a  mild to moderate intensity fixed perfusion abnormality in the inferior wall of the left ventricle secondary to diaphragm attenuation.    The gated perfusion images showed an ejection fraction of 68% at rest. The gated perfusion images showed an ejection fraction of 70% post stress. Normal ejection fraction is greater than 53%.    There is normal wall motion at rest and post stress.    LV cavity size is normal at rest and normal at stress.    The ECG portion of the study is negative for ischemia.    There were no arrhythmias during stress.    No valid procedures specified.    PHYSICAL EXAM  CONSTITUTIONAL: Well built, well nourished in no apparent distress  NECK: no carotid bruit, no JVD  LUNGS: CTA  CHEST WALL: no tenderness  HEART: regular rate and rhythm, S1, S2 normal, no murmur, click, rub or gallop   ABDOMEN: soft, non-tender; bowel sounds normal; no masses,  no organomegaly  EXTREMITIES: Extremities normal, no edema, no calf tenderness noted  NEURO: AAO X 3    I HAVE REVIEWED :    The vital signs, nurses notes, and all the pertinent radiology and labs.        Current Outpatient Medications   Medication Instructions    acai berry extract 500 mg Cap Oral    acyclovir (ZOVIRAX) 400 mg, Oral, 2 times daily    aspirin (ECOTRIN) 81 mg, Oral, Daily    calcium carbonate (CALCIUM 300 ORAL) Oral    codeine-butalbital-ASA-caffeine (BUTALBITAL COMPOUND W/CODEINE) 33--40 mg Cap Take by mouth.    coenzyme Q10 100 mg, Oral, Daily    cyanocobalamin, vitamin B-12, (B-12 COMPLIANCE) 1,000 mcg/mL Kit 1 mL, Injection, Every 30 days    DULoxetine (CYMBALTA) 60 MG capsule  Take by mouth.    erythromycin (ROMYCIN) ophthalmic ointment No dose, route, or frequency recorded.    HYDROcodone-acetaminophen (NORCO)  mg per tablet hydrocodone 10 mg-acetaminophen 325 mg tablet    KRILL OIL ORAL Oral    levothyroxine (SYNTHROID) 75 mcg, Oral, Before breakfast    losartan (COZAAR) 25 mg, Oral, 2 times daily    mv-min/vit C/glut/lysine/hb124 (IMMUNE SUPPORT ORAL) Oral    neomycin-polymyxin-dexamethasone (DEXACINE) 3.5 mg/g-10,000 unit/g-0.1 % Oint No dose, route, or frequency recorded.    omeprazole-sodium bicarbonate 20-1.1 mg-gram Cap 60 mg, Oral, Daily    oxybutynin (DITROPAN-XL) 5 mg, Oral, Daily    tobramycin sulfate 0.3% (TOBREX) 0.3 % ophthalmic solution No dose, route, or frequency recorded.    topiramate (TOPAMAX) 100 mg, Oral, 2 times daily    vit A/vit C/vit E/zinc/copper (EYE MULTIVITAMIN ORAL) Oral          Assessment & Plan     1. Atherosclerosis of abdominal aorta  Overview:  HCC code noted on Abd US    Assessment & Plan:  Unfortunately she was patient was severe statin intolerance.  Continue on risk factor modification careful dietary intake she exercises and a stationary bicycle on a regular basis encouraged her to continue the same and also to maintain on aspirin 81 mg daily.      2. Primary hypertension  Assessment & Plan:  Blood pressure has been very well controlled I have in fact I have encouraged her to see if she can take losartan 25 mg at nighttime and skipped the morning dose and still maintain adequate blood pressure.  And this may be contributing to her fatigue symptoms during the day and encouraged her to keep herself adequately hydrated      3. Mixed hyperlipidemia  Assessment & Plan:  Severe mixed dyslipidemia statin intolerance is noted diet control, Krill oil at this time.      4. Hypothyroidism due to non-medication exogenous substances  Assessment & Plan:  Thyroid dysfunction and continue on levothyroxine 75 mcg a day.      5. Gastroesophageal reflux  disease without esophagitis  Assessment & Plan:  Maintaining on omeprazole 60 mg daily and continue the same encouraged her to take magnesium if tolerated 400 mg daily of oxide preparation.      6. Chronic fatigue  Assessment & Plan:  History of chronic fatigue and acyclovir seemed to be helping her encouraged her to continue the same as directed by primary care            Follow up in about 6 months (around 2/20/2025).

## 2024-08-20 NOTE — ASSESSMENT & PLAN NOTE
Unfortunately she was patient was severe statin intolerance.  Continue on risk factor modification careful dietary intake she exercises and a stationary bicycle on a regular basis encouraged her to continue the same and also to maintain on aspirin 81 mg daily.

## 2024-08-20 NOTE — ASSESSMENT & PLAN NOTE
Blood pressure has been very well controlled I have in fact I have encouraged her to see if she can take losartan 25 mg at nighttime and skipped the morning dose and still maintain adequate blood pressure.  And this may be contributing to her fatigue symptoms during the day and encouraged her to keep herself adequately hydrated

## 2024-11-07 DIAGNOSIS — I10 HYPERTENSION, UNSPECIFIED TYPE: ICD-10-CM

## 2024-11-07 DIAGNOSIS — B00.9 HERPES SIMPLEX TYPE 1 INFECTION: ICD-10-CM

## 2024-11-07 RX ORDER — LOSARTAN POTASSIUM 25 MG/1
25 TABLET ORAL 2 TIMES DAILY
Qty: 180 TABLET | Refills: 3 | Status: SHIPPED | OUTPATIENT
Start: 2024-11-07

## 2024-11-07 RX ORDER — ACYCLOVIR 400 MG/1
TABLET ORAL
Qty: 180 TABLET | Refills: 0 | OUTPATIENT
Start: 2024-11-07

## 2024-11-07 NOTE — TELEPHONE ENCOUNTER
Refill Decision Note   Gala Gaines  is requesting a refill authorization.  Brief Assessment and Rationale for Refill:  Quick Discontinue     Medication Therapy Plan:    Pharmacy is requesting new scripts for the following medications without required information, (sig/ frequency/qty/etc)      Medication Reconciliation Completed: No     Comments: Pharmacies have been requesting medications for patients without required information, (sig, frequency, qty, etc.). In addition, requests are sent for medication(s) pt. are currently not taking, and medications patients have never taken.    We have spoken to the pharmacies about these request types and advised their teams previously that we are unable to assess these New Script requests and require all details for these requests. This is a known issue and has been reported.     Note composed:1:06 PM 11/07/2024

## 2024-11-07 NOTE — TELEPHONE ENCOUNTER
No care due was identified.  [facility] Embedded Care Due Messages. Reference number: [id number].   11/07/2024 10:16:17 AM CST

## 2024-12-16 ENCOUNTER — PATIENT OUTREACH (OUTPATIENT)
Dept: ADMINISTRATIVE | Facility: HOSPITAL | Age: 79
End: 2024-12-16
Payer: MEDICARE

## 2024-12-16 ENCOUNTER — PATIENT MESSAGE (OUTPATIENT)
Dept: ADMINISTRATIVE | Facility: HOSPITAL | Age: 79
End: 2024-12-16
Payer: MEDICARE

## 2024-12-16 DIAGNOSIS — Z78.0 MENOPAUSE: ICD-10-CM

## 2024-12-23 ENCOUNTER — TELEPHONE (OUTPATIENT)
Dept: CARDIOLOGY | Facility: CLINIC | Age: 79
End: 2024-12-23
Payer: MEDICARE

## 2024-12-23 NOTE — TELEPHONE ENCOUNTER
----- Message from Pamela sent at 12/23/2024 11:21 AM CST -----  Contact: Patient  Type:  Sooner Apoointment Request    Caller is requesting a sooner appointment.  Caller declined first available appointment listed below.  Caller will not accept being placed on the waitlist and is requesting a message be sent to doctor.  Name of Caller:Patient    When is the first available appointment?Jan 28th     Symptoms:Dizzy Spells    Would the patient rather a call back or a response via [facility]? Call    Best Call Back Number:[phone]    Additional Information: Please call to advise

## 2025-01-27 ENCOUNTER — TELEPHONE (OUTPATIENT)
Dept: CARDIOLOGY | Facility: CLINIC | Age: 80
End: 2025-01-27
Payer: MEDICARE

## 2025-01-27 NOTE — TELEPHONE ENCOUNTER
----- Message from [facility]igo sent at 1/27/2025  3:58 PM CST -----  Contact: PT  Type:  Sooner Apoointment Request    Caller is requesting a sooner appointment.  Caller declined first available appointment listed below.  Caller will not accept being placed on the waitlist and is requesting a message be sent to doctor.  Name of Caller:PT   When is the first available appointment?N/A  Symptoms: F/U   Would the patient rather a call back or a response via [facility]? CALL   Best Call Back Number: [phone]  Additional Information: PT NEEDS TO CANCEL HER 01/28/25 BECAUSE HER SON IS BEING ADMITTED INTO THE HOSPITAL   THANK YOU

## 2025-04-08 ENCOUNTER — OFFICE VISIT (OUTPATIENT)
Dept: FAMILY MEDICINE | Facility: CLINIC | Age: 80
End: 2025-04-08
Payer: MEDICARE

## 2025-04-08 VITALS
SYSTOLIC BLOOD PRESSURE: 122 MMHG | OXYGEN SATURATION: 100 % | HEIGHT: 65 IN | TEMPERATURE: 98 F | BODY MASS INDEX: 24.68 KG/M2 | WEIGHT: 148.13 LBS | DIASTOLIC BLOOD PRESSURE: 60 MMHG | HEART RATE: 81 BPM

## 2025-04-08 DIAGNOSIS — D53.9 MACROCYTIC ANEMIA: ICD-10-CM

## 2025-04-08 DIAGNOSIS — R35.0 URINARY FREQUENCY: ICD-10-CM

## 2025-04-08 DIAGNOSIS — I10 PRIMARY HYPERTENSION: Primary | ICD-10-CM

## 2025-04-08 DIAGNOSIS — R79.9 ABNORMAL FINDING OF BLOOD CHEMISTRY, UNSPECIFIED: ICD-10-CM

## 2025-04-08 DIAGNOSIS — N39.498 OTHER URINARY INCONTINENCE: ICD-10-CM

## 2025-04-08 DIAGNOSIS — E78.2 MIXED HYPERLIPIDEMIA: ICD-10-CM

## 2025-04-08 DIAGNOSIS — E03.9 HYPOTHYROIDISM, UNSPECIFIED TYPE: ICD-10-CM

## 2025-04-08 DIAGNOSIS — F51.11 PRIMARY HYPERSOMNIA: ICD-10-CM

## 2025-04-08 DIAGNOSIS — R53.83 FATIGUE, UNSPECIFIED TYPE: ICD-10-CM

## 2025-04-08 PROCEDURE — 1126F AMNT PAIN NOTED NONE PRSNT: CPT | Mod: CPTII,S$GLB,,

## 2025-04-08 PROCEDURE — 3288F FALL RISK ASSESSMENT DOCD: CPT | Mod: CPTII,S$GLB,,

## 2025-04-08 PROCEDURE — 3074F SYST BP LT 130 MM HG: CPT | Mod: CPTII,S$GLB,,

## 2025-04-08 PROCEDURE — 3078F DIAST BP <80 MM HG: CPT | Mod: CPTII,S$GLB,,

## 2025-04-08 PROCEDURE — 99999 PR PBB SHADOW E&M-EST. PATIENT-LVL IV: CPT | Mod: PBBFAC,,,

## 2025-04-08 PROCEDURE — 1159F MED LIST DOCD IN RCRD: CPT | Mod: CPTII,S$GLB,,

## 2025-04-08 PROCEDURE — 99214 OFFICE O/P EST MOD 30 MIN: CPT | Mod: S$GLB,,,

## 2025-04-08 PROCEDURE — 1101F PT FALLS ASSESS-DOCD LE1/YR: CPT | Mod: CPTII,S$GLB,,

## 2025-04-08 PROCEDURE — 1160F RVW MEDS BY RX/DR IN RCRD: CPT | Mod: CPTII,S$GLB,,

## 2025-04-08 RX ORDER — OXYBUTYNIN CHLORIDE 5 MG/1
5 TABLET, EXTENDED RELEASE ORAL DAILY
Qty: 90 TABLET | Refills: 0 | Status: SHIPPED | OUTPATIENT
Start: 2025-04-08 | End: 2025-07-07

## 2025-04-08 NOTE — PROGRESS NOTES
[facility]     Subjective:       Patient ID:  [id number]     Chief Complaint: Fatigue    Gala Gaines is a 79 y.o. female with a past medical history significant for HTN, HLD, migraine, HSV-1, migraines, and fatigue who presents to the clinic for fatigue.     The patient presents to the clinic with complaints of chronic fatigue, which she has been experiencing for several years. Recently, she reports that her fatigue has become more persistent, with increased daytime sleepiness. She notes that she is no longer able to help around the house as she used to. While she denies any chest pain or shortness of breath, her granddaughter has mentioned that she snores at night. The patient also reports waking up multiple times during the night to urinate. She has a history of overactive bladder, and a chart review shows she was previously prescribed oxybutynin. However, she states that she has not been taking the medication recently and does not recall if it provided relief.The patient also mentions taking acyclovir for her chronic fatigue but notes that it is no longer effective in providing relief.    Additionally, the patient complains of lower right-sided back pain that has been ongoing for several years. She denies any pain with movement, and the pain is intermittent. Currently, she is not experiencing any back pain and denies any urinary symptoms such as hematuria, dysuria, or abdominal pain. However, she does endorse urinary frequency and cloudy urine. She is worried about pancreatic cancer as it runs in her family.     The patient has no other concerns at this time but requests lab work to check her cholesterol and blood counts. Care gaps were discussed, including a DEXA scan, which she reports having received through the [facility].      Past Medical History:   Diagnosis Date    Coronary artery disease     Migraine headache     Thyroid disease       Review of patient's allergies indicates:   Allergen  Reactions    Levofloxacin      Other reaction(s): fainting spells  Other reaction(s): fainting spells    Nuts  [tree nut]        Lab Results   Component Value Date    WBC 6.22 05/20/2024    HGB 12.0 05/20/2024    HCT 36.8 (L) 05/20/2024     05/20/2024    CHOL 200 (H) 05/20/2024    TRIG 42 05/20/2024    HDL 56 05/20/2024    ALT 35 05/20/2024    AST 25 05/20/2024     05/20/2024    K 4.2 05/20/2024     05/20/2024    CREATININE 0.8 05/20/2024    BUN 20 05/20/2024    CO2 25 05/20/2024    TSH 1.729 05/20/2024    INR 1.0 09/29/2019    HGBA1C 5.3 05/20/2024       Review of Systems   Constitutional:  Positive for unexpected weight change. Negative for chills and fever.   Respiratory:  Negative for cough and shortness of breath.    Cardiovascular:  Negative for chest pain.   Gastrointestinal:  Negative for abdominal pain, diarrhea, nausea and vomiting.   Genitourinary:  Negative for dysuria and hematuria.         Objective:      Physical Exam  Constitutional:       General: She is not in acute distress.     Appearance: Normal appearance. She is not ill-appearing or toxic-appearing.   HENT:      Head: Normocephalic and atraumatic.      Nose: Nose normal.   Cardiovascular:      Rate and Rhythm: Normal rate and regular rhythm.      Pulses: Normal pulses.      Heart sounds: Normal heart sounds. No murmur heard.     No friction rub.   Pulmonary:      Effort: Pulmonary effort is normal. No respiratory distress.      Breath sounds: Normal breath sounds. No wheezing.   Abdominal:      General: Abdomen is flat. Bowel sounds are normal. There is no distension.      Tenderness: There is no abdominal tenderness. There is no right CVA tenderness, left CVA tenderness, guarding or rebound. Negative signs include Saldaña's sign.   Musculoskeletal:      Cervical back: Normal range of motion. No tenderness or bony tenderness.      Thoracic back: No tenderness or bony tenderness. Normal range of motion.      Lumbar back: No  tenderness or bony tenderness. Normal range of motion.      Right lower leg: No edema.      Left lower leg: No edema.   Skin:     General: Skin is warm and dry.      Capillary Refill: Capillary refill takes less than 2 seconds.   Neurological:      General: No focal deficit present.      Mental Status: She is alert and oriented to person, place, and time.      Cranial Nerves: No cranial nerve deficit.   Psychiatric:         Mood and Affect: Mood normal.           Assessment:       1. Primary hypertension    2. Mixed hyperlipidemia    3. Fatigue, unspecified type    4. Hypothyroidism, unspecified type    5. Macrocytic anemia    6. Other urinary incontinence    7. Primary hypersomnia    8. Urinary frequency    9. Abnormal finding of blood chemistry, unspecified          Plan:       Gala was seen today for fatigue.    Diagnoses and all orders for this visit:    Primary hypertension  Comments:  Well-controlled. Continue medication as prescribed.  Orders:  -     Comprehensive Metabolic Panel; Future  -     CBC Auto Differential; Future  -     Hemoglobin A1C; Future    Mixed hyperlipidemia  Comments:  Stable. Continue high fiber and low fat diet.  Orders:  -     Lipid Panel; Future  -     Hemoglobin A1C; Future    Fatigue, unspecified type  Comments:  Chronic with no clear etiology. Will obtain sleep study. STOP-BANG associated with intermediate risk.  Orders:  -     Vitamin B12; Future  -     Folate; Future  -     Ferritin; Future  -     Iron and TIBC; Future  -     Home Sleep Study; Future    Hypothyroidism, unspecified type  Comments:  Stable. Continue medication as prescribed.  Orders:  -     TSH; Future    Macrocytic anemia  Comments:  Chronic and stable. Folate and Vit B12 WNL.  Orders:  -     Vitamin B12; Future  -     Folate; Future    Other urinary incontinence  Comments:  Likely OAB. Patient previously on oxybutynin. Will refill today and reassess symptoms.  Orders:  -     oxybutynin (DITROPAN-XL) 5 MG TR24;  Take 1 tablet (5 mg total) by mouth once daily.    Primary hypersomnia  -     Home Sleep Study; Future    Urinary frequency  Comments:  UA ordered. Recommendations pending results. Likely related to OAB.  Orders:  -     Urinalysis Microscopic; Future  -     Urinalysis, Reflex to Urine Culture; Future    Abnormal finding of blood chemistry, unspecified  -     Hemoglobin A1C; Future  -     Ferritin; Future  -     Iron and TIBC; Future             Follow up in about 6 months (around 10/8/2025).    Daksha Calles PA-C  Family Medicine Physician Assistant     Future Appointments       Date Provider Specialty Appt Notes    8/4/2025 Abdirahman Calle MD Family Medicine 6 moth f/u  [facility] DUE//confirmed    4/10/2026 Caitie Macario MD Dermatology est care, bumps on scalp             All of your core healthy metrics are met.       I spent a total of 30 minutes on the day of the visit.This includes face to face time and non-face to face time preparing to see the patient (eg, review of tests), obtaining and/or reviewing separately obtained history, documenting clinical information in the electronic or other health record, independently interpreting results and communicating results to the patient/family/caregiver, or care coordinator.

## 2025-05-05 DIAGNOSIS — B00.9 HERPES SIMPLEX TYPE 1 INFECTION: ICD-10-CM

## 2025-05-05 DIAGNOSIS — N39.498 OTHER URINARY INCONTINENCE: ICD-10-CM

## 2025-05-05 RX ORDER — CYANOCOBALAMIN 1000 UG/ML
INJECTION, SOLUTION INTRAMUSCULAR; SUBCUTANEOUS
Qty: 3 ML | Refills: 0 | OUTPATIENT
Start: 2025-05-05

## 2025-05-05 RX ORDER — OXYBUTYNIN CHLORIDE 5 MG/1
5 TABLET, EXTENDED RELEASE ORAL DAILY
Qty: 90 TABLET | Refills: 0 | Status: SHIPPED | OUTPATIENT
Start: 2025-05-05 | End: 2025-08-03

## 2025-05-05 RX ORDER — ACYCLOVIR 400 MG/1
TABLET ORAL
Qty: 180 TABLET | Refills: 0 | OUTPATIENT
Start: 2025-05-05

## 2025-05-05 NOTE — TELEPHONE ENCOUNTER
Care Due:                  Date            Visit Type   Department     Provider  --------------------------------------------------------------------------------                                EP -                              PRIMARY      [facility]  Last Visit: 07-      CARE ([facility])   DARSHAN Calle                              EP -                              PRIMARY      [facility]  Next Visit: 05-      CARE ([facility])   DARSHAN Calle                                                            Last  Test          Frequency    Reason                     Performed    Due Date  --------------------------------------------------------------------------------    CBC.........  12 months..  acyclovir................  05-   05-    Cr..........  12 months..  acyclovir................  05-   05-    [facility] Embedded Care Due Messages. Reference number: [id number].   5/05/2025 2:23:13 PM CDT

## 2025-05-06 NOTE — TELEPHONE ENCOUNTER
Refill Decision Note   Gala Gaines  is requesting a refill authorization.  Brief Assessment and Rationale for Refill:        Medication Therapy Plan:  Pharmacy is requesting new scripts for the following medications without required information, (sig/ frequency/qty/etc)           Comments: Pharmacies have been requesting medications for patients without required information, (sig, frequency, qty, etc.). In addition, requests are sent for medication(s) pt. are currently not taking, and medications patients have never taken.    We have spoken to the pharmacies about these request types and advised their teams previously that we are unable to assess these New Script requests and require all details for these requests. This is a known issue and has been reported.      No Care Gaps recommended.     Note composed:10:23 PM 05/05/2025

## 2025-06-09 ENCOUNTER — TELEPHONE (OUTPATIENT)
Dept: CARDIOLOGY | Facility: CLINIC | Age: 80
End: 2025-06-09
Payer: MEDICARE

## 2025-06-09 NOTE — TELEPHONE ENCOUNTER
Copied from CRM #[id number]. Topic: Medications - Medication Question  >> Jun 9, 2025 10:47 AM Mary wrote:  Type:  Needs Medical Advice    Who Called: Patient   Symptoms (please be specific): High /82   How long has patient had these symptoms:    Pharmacy name and phone #:    [id number] - [place] - [address]  [address] [address]  Phone: [phone] Fax: [phone]    Would the patient rather a call back or a response via [facility]? Call   Best Call Back Number: [phone] (home)  Additional Information: Patient takes Losartan 25mg and thinks she should be given a higher does. Please call pt to discuss.

## 2025-06-10 NOTE — TELEPHONE ENCOUNTER
Called pt to let her know to send in a bp reading morning and noon logs daily      Last 148/85  Pulse 84  This 146/83  Pulse 82  Noon 145/80  Pulse 81

## 2025-06-16 ENCOUNTER — OFFICE VISIT (OUTPATIENT)
Dept: FAMILY MEDICINE | Facility: CLINIC | Age: 80
End: 2025-06-16
Payer: MEDICARE

## 2025-06-16 ENCOUNTER — LAB VISIT (OUTPATIENT)
Dept: LAB | Facility: HOSPITAL | Age: 80
End: 2025-06-16
Attending: FAMILY MEDICINE
Payer: MEDICARE

## 2025-06-16 VITALS
BODY MASS INDEX: 25.23 KG/M2 | HEART RATE: 86 BPM | SYSTOLIC BLOOD PRESSURE: 122 MMHG | OXYGEN SATURATION: 99 % | DIASTOLIC BLOOD PRESSURE: 60 MMHG | HEIGHT: 65 IN | RESPIRATION RATE: 17 BRPM | WEIGHT: 151.44 LBS | TEMPERATURE: 98 F

## 2025-06-16 DIAGNOSIS — R79.9 ABNORMAL BLOOD FINDING: ICD-10-CM

## 2025-06-16 DIAGNOSIS — E78.2 MIXED HYPERLIPIDEMIA: ICD-10-CM

## 2025-06-16 DIAGNOSIS — E03.9 HYPOTHYROIDISM, UNSPECIFIED TYPE: ICD-10-CM

## 2025-06-16 DIAGNOSIS — I10 PRIMARY HYPERTENSION: ICD-10-CM

## 2025-06-16 DIAGNOSIS — R53.83 FATIGUE, UNSPECIFIED TYPE: Primary | ICD-10-CM

## 2025-06-16 DIAGNOSIS — R53.83 FATIGUE, UNSPECIFIED TYPE: ICD-10-CM

## 2025-06-16 PROCEDURE — 3078F DIAST BP <80 MM HG: CPT | Mod: CPTII,S$GLB,, | Performed by: FAMILY MEDICINE

## 2025-06-16 PROCEDURE — 99999 PR PBB SHADOW E&M-EST. PATIENT-LVL V: CPT | Mod: PBBFAC,,, | Performed by: FAMILY MEDICINE

## 2025-06-16 PROCEDURE — 3288F FALL RISK ASSESSMENT DOCD: CPT | Mod: CPTII,S$GLB,, | Performed by: FAMILY MEDICINE

## 2025-06-16 PROCEDURE — 3074F SYST BP LT 130 MM HG: CPT | Mod: CPTII,S$GLB,, | Performed by: FAMILY MEDICINE

## 2025-06-16 PROCEDURE — 1101F PT FALLS ASSESS-DOCD LE1/YR: CPT | Mod: CPTII,S$GLB,, | Performed by: FAMILY MEDICINE

## 2025-06-16 PROCEDURE — 82607 VITAMIN B-12: CPT

## 2025-06-16 PROCEDURE — G2211 COMPLEX E/M VISIT ADD ON: HCPCS | Mod: S$GLB,,, | Performed by: FAMILY MEDICINE

## 2025-06-16 PROCEDURE — 36415 COLL VENOUS BLD VENIPUNCTURE: CPT | Mod: PO

## 2025-06-16 PROCEDURE — 99214 OFFICE O/P EST MOD 30 MIN: CPT | Mod: S$GLB,,, | Performed by: FAMILY MEDICINE

## 2025-06-16 PROCEDURE — 82306 VITAMIN D 25 HYDROXY: CPT

## 2025-06-16 PROCEDURE — 84443 ASSAY THYROID STIM HORMONE: CPT

## 2025-06-16 PROCEDURE — 85025 COMPLETE CBC W/AUTO DIFF WBC: CPT

## 2025-06-16 PROCEDURE — 83036 HEMOGLOBIN GLYCOSYLATED A1C: CPT

## 2025-06-16 PROCEDURE — 80061 LIPID PANEL: CPT

## 2025-06-16 PROCEDURE — 1159F MED LIST DOCD IN RCRD: CPT | Mod: CPTII,S$GLB,, | Performed by: FAMILY MEDICINE

## 2025-06-16 PROCEDURE — 80053 COMPREHEN METABOLIC PANEL: CPT

## 2025-06-16 PROCEDURE — 1126F AMNT PAIN NOTED NONE PRSNT: CPT | Mod: CPTII,S$GLB,, | Performed by: FAMILY MEDICINE

## 2025-06-16 PROCEDURE — 83540 ASSAY OF IRON: CPT

## 2025-06-16 RX ORDER — CYANOCOBALAMIN 1000 UG/ML
1000 INJECTION, SOLUTION INTRAMUSCULAR; SUBCUTANEOUS
Status: SHIPPED | OUTPATIENT
Start: 2025-06-16

## 2025-06-16 NOTE — PROGRESS NOTES
Subjective:   Patient ID: Gala Gaines is a 79 y.o. female     Chief Complaint:Follow-up (6 month f/u)      Notes increased fatigue. Going on for months. Has questions about a medication recommended by a family member named sunosi.    Follow-up  Pertinent negatives include no abdominal pain or chest pain.     Review of Systems   Respiratory:  Negative for shortness of breath.    Cardiovascular:  Negative for chest pain.   Gastrointestinal:  Negative for abdominal pain.   Genitourinary:  Negative for dysuria.     Past Medical History:   Diagnosis Date    Coronary artery disease     Migraine headache     Thyroid disease      Past Surgical History:   Procedure Laterality Date    APPENDECTOMY       SECTION      CHOLECYSTECTOMY      HYSTERECTOMY      SHOULDER ARTHROSCOPY      right    TONSILLECTOMY       Objective:     Vitals:    25 1310   BP: 122/60   Pulse: 86   Resp: 17   Temp: 98 °F (36.7 °C)     Body mass index is 25.2 kg/m².  Physical Exam  Vitals and nursing note reviewed.   Constitutional:       Appearance: She is well-developed.   HENT:      Head: Normocephalic and atraumatic.   Eyes:      General: No scleral icterus.     Conjunctiva/sclera: Conjunctivae normal.   Cardiovascular:      Heart sounds: No murmur heard.  Pulmonary:      Effort: Pulmonary effort is normal. No respiratory distress.   Musculoskeletal:         General: No deformity. Normal range of motion.      Cervical back: Normal range of motion and neck supple.   Skin:     Coloration: Skin is not pale.      Findings: No rash.   Neurological:      Mental Status: She is alert and oriented to person, place, and time.   Psychiatric:         Behavior: Behavior normal.         Thought Content: Thought content normal.         Judgment: Judgment normal.       Assessment:     1. Fatigue, unspecified type    2. Primary hypertension    3. Mixed hyperlipidemia    4. Abnormal blood finding    5. Hypothyroidism, unspecified type      Plan:    Fatigue, unspecified type  -     CBC Auto Differential; Future; Expected date: 06/16/2025  -     Comprehensive Metabolic Panel; Future; Expected date: 06/16/2025  -     Hemoglobin A1C; Future; Expected date: 06/16/2025  -     Lipid Panel; Future; Expected date: 06/16/2025  -     TSH; Future; Expected date: 06/16/2025  -     Vitamin B12; Future; Expected date: 06/16/2025  -     Vitamin D; Future; Expected date: 06/16/2025  -     Iron and TIBC; Future; Expected date: 06/16/2025  -     cyanocobalamin injection 1,000 mcg    Primary hypertension  -     Hemoglobin A1C; Future; Expected date: 06/16/2025  -     Lipid Panel; Future; Expected date: 06/16/2025    Mixed hyperlipidemia  -     CBC Auto Differential; Future; Expected date: 06/16/2025  -     Comprehensive Metabolic Panel; Future; Expected date: 06/16/2025  -     Hemoglobin A1C; Future; Expected date: 06/16/2025  -     Lipid Panel; Future; Expected date: 06/16/2025    Abnormal blood finding  -     Hemoglobin A1C; Future; Expected date: 06/16/2025  -     Lipid Panel; Future; Expected date: 06/16/2025  -     Vitamin B12; Future; Expected date: 06/16/2025  -     Vitamin D; Future; Expected date: 06/16/2025  -     Iron and TIBC; Future; Expected date: 06/16/2025    Hypothyroidism, unspecified type  -     TSH; Future; Expected date: 06/16/2025  -     Vitamin B12; Future; Expected date: 06/16/2025  -     Vitamin D; Future; Expected date: 06/16/2025      Chronic condition not otherwise mentioned is stable      Total time spent of Greater than 30 minutes minutes on the day of the visit.This includes face to face time and preparing to see the patient, obtaining and reviewing separately obtained history, documenting clinical information in the electronic or other health record, independently interpreting results and communicating results to the patient/family/caregiver, or care coordinator.    Established patient with me has been instructed that must see me at least 1 time  yearly (every 365 days) for refills of medications. Seeing other providers in this clinic is fine but expectation is to see me yearly.    Abdirahman Calle MD  06/16/2025    Portions of this note have been dictated with NOELLE Phan.

## 2025-06-17 ENCOUNTER — RESULTS FOLLOW-UP (OUTPATIENT)
Dept: FAMILY MEDICINE | Facility: CLINIC | Age: 80
End: 2025-06-17

## 2025-06-17 LAB
25(OH)D3+25(OH)D2 SERPL-MCNC: 66 NG/ML (ref 30–96)
ABSOLUTE EOSINOPHIL (OHS): 0.24 K/UL
ABSOLUTE MONOCYTE (OHS): 0.64 K/UL (ref 0.3–1)
ABSOLUTE NEUTROPHIL COUNT (OHS): 3.4 K/UL (ref 1.8–7.7)
ALBUMIN SERPL BCP-MCNC: 4.3 G/DL (ref 3.5–5.2)
ALP SERPL-CCNC: 82 UNIT/L (ref 40–150)
ALT SERPL W/O P-5'-P-CCNC: 15 UNIT/L (ref 10–44)
ANION GAP (OHS): 8 MMOL/L (ref 8–16)
AST SERPL-CCNC: 20 UNIT/L (ref 11–45)
BASOPHILS # BLD AUTO: 0.05 K/UL
BASOPHILS NFR BLD AUTO: 0.6 %
BILIRUB SERPL-MCNC: 0.1 MG/DL (ref 0.1–1)
BUN SERPL-MCNC: 20 MG/DL (ref 8–23)
CALCIUM SERPL-MCNC: 9.3 MG/DL (ref 8.7–10.5)
CHLORIDE SERPL-SCNC: 108 MMOL/L (ref 95–110)
CHOLEST SERPL-MCNC: 209 MG/DL (ref 120–199)
CHOLEST/HDLC SERPL: 3.2 {RATIO} (ref 2–5)
CO2 SERPL-SCNC: 23 MMOL/L (ref 23–29)
CREAT SERPL-MCNC: 0.6 MG/DL (ref 0.5–1.4)
EAG (OHS): 103 MG/DL (ref 68–131)
ERYTHROCYTE [DISTWIDTH] IN BLOOD BY AUTOMATED COUNT: 14.6 % (ref 11.5–14.5)
GFR SERPLBLD CREATININE-BSD FMLA CKD-EPI: >60 ML/MIN/1.73/M2
GLUCOSE SERPL-MCNC: 78 MG/DL (ref 70–110)
HBA1C MFR BLD: 5.2 % (ref 4–5.6)
HCT VFR BLD AUTO: 36.4 % (ref 37–48.5)
HDLC SERPL-MCNC: 65 MG/DL (ref 40–75)
HDLC SERPL: 31.1 % (ref 20–50)
HGB BLD-MCNC: 11.6 GM/DL (ref 12–16)
IMM GRANULOCYTES # BLD AUTO: 0.02 K/UL (ref 0–0.04)
IMM GRANULOCYTES NFR BLD AUTO: 0.3 % (ref 0–0.5)
IRON SATN MFR SERPL: 21 % (ref 20–50)
IRON SERPL-MCNC: 84 UG/DL (ref 30–160)
LDLC SERPL CALC-MCNC: 123.2 MG/DL (ref 63–159)
LYMPHOCYTES # BLD AUTO: 3.5 K/UL (ref 1–4.8)
MCH RBC QN AUTO: 32 PG (ref 27–31)
MCHC RBC AUTO-ENTMCNC: 31.9 G/DL (ref 32–36)
MCV RBC AUTO: 100 FL (ref 82–98)
NONHDLC SERPL-MCNC: 144 MG/DL
NUCLEATED RBC (/100WBC) (OHS): 0 /100 WBC
PLATELET # BLD AUTO: 264 K/UL (ref 150–450)
PMV BLD AUTO: 11 FL (ref 9.2–12.9)
POTASSIUM SERPL-SCNC: 4.5 MMOL/L (ref 3.5–5.1)
PROT SERPL-MCNC: 6.9 GM/DL (ref 6–8.4)
RBC # BLD AUTO: 3.63 M/UL (ref 4–5.4)
RELATIVE EOSINOPHIL (OHS): 3.1 %
RELATIVE LYMPHOCYTE (OHS): 44.6 % (ref 18–48)
RELATIVE MONOCYTE (OHS): 8.2 % (ref 4–15)
RELATIVE NEUTROPHIL (OHS): 43.2 % (ref 38–73)
SODIUM SERPL-SCNC: 139 MMOL/L (ref 136–145)
TIBC SERPL-MCNC: 398 UG/DL (ref 250–450)
TRANSFERRIN SERPL-MCNC: 269 MG/DL (ref 200–375)
TRIGL SERPL-MCNC: 104 MG/DL (ref 30–150)
TSH SERPL-ACNC: 1.01 UIU/ML (ref 0.4–4)
VIT B12 SERPL-MCNC: 845 PG/ML (ref 210–950)
WBC # BLD AUTO: 7.85 K/UL (ref 3.9–12.7)

## 2025-09-02 DIAGNOSIS — Z12.31 ENCOUNTER FOR SCREENING MAMMOGRAM FOR MALIGNANT NEOPLASM OF BREAST: Primary | ICD-10-CM

## 2025-09-02 DIAGNOSIS — Z80.3 FAMILY HISTORY OF MALIGNANT NEOPLASM OF BREAST: ICD-10-CM
